# Patient Record
Sex: MALE | Race: WHITE | Employment: FULL TIME | URBAN - METROPOLITAN AREA
[De-identification: names, ages, dates, MRNs, and addresses within clinical notes are randomized per-mention and may not be internally consistent; named-entity substitution may affect disease eponyms.]

---

## 2018-05-18 ENCOUNTER — HOSPITAL ENCOUNTER (OUTPATIENT)
Dept: PHYSICAL THERAPY | Age: 53
Discharge: HOME | End: 2018-05-18
Attending: FAMILY MEDICINE
Payer: COMMERCIAL

## 2018-05-18 DIAGNOSIS — M25.551 RIGHT HIP PAIN: ICD-10-CM

## 2018-05-18 DIAGNOSIS — M54.50 CHRONIC RIGHT-SIDED LOW BACK PAIN WITHOUT SCIATICA: ICD-10-CM

## 2018-05-18 DIAGNOSIS — G89.29 CHRONIC RIGHT-SIDED LOW BACK PAIN WITHOUT SCIATICA: ICD-10-CM

## 2018-05-18 PROCEDURE — 97161 PT EVAL LOW COMPLEX 20 MIN: CPT

## 2018-05-18 PROCEDURE — 97110 THERAPEUTIC EXERCISES: CPT

## 2018-05-18 NOTE — THERAPY EVALUATION
Katemarkosmatilda Yamilet  : 1965  Primary: Rhona Guzman [facility]  Secondary:  [facility] [address] [place] [address], [address], [address]  [phone]   [phone]         OUTPATIENT PHYSICAL THERAPY:Initial Assessment 2018    ICD-10: Treatment Diagnosis: LOW BACK PAIN M54.5; SPINAL INSTABILITIES, LUMBAR REGION M53.2X6    Precautions/Allergies: none reported  Fall Risk Score: 1 (? 5 = High Risk)  MD Orders: evaluate and treat MEDICAL/REFERRING DIAGNOSIS:Right hip pain [M25.551]  Chronic right-sided low back pain without sciatica [M54.5, G89.29]  DATE OF ONSET: about a year ago  REFERRING PHYSICIAN:Jimbo Kaplan DO  RETURN PHYSICIAN APPOINTMENT: did not specify      INITIAL ASSESSMENT:  Mr. Flakito Wise presents to physical therapy with symptoms of right lower back and hip pain. The examination reveals instability in the R L4-S1 facets, core wkns, posterior hip, hip flexor and QL tightness and decreased understanding of proper body mechanics. The examination is of low complexity due to a stable clinical presentation. Skilled physical therapy is recommended to progress the plan of care in order for the patient to return to full function with minimal symptoms. PROBLEM LIST (Impacting functional limitations):  1. Decreased Strength  2. Decreased ADL/Functional Activities  3. Increased Pain  4. Decreased Activity Tolerance  5. Decreased Flexibility/Joint Mobility  6. Decreased [place] with Home Exercise Program INTERVENTIONS PLANNED:  1. Cold  2. Heat  3. Home Exercise Program (HEP)  4. Manual Therapy  5. Range of Motion (ROM)  6. Therapeutic Exercise/Strengthening     TREATMENT PLAN:  Effective Dates: 18 TO 18. Frequency/Duration: 2 times a week for 8 weeks  GOALS: (Goals have been discussed and agreed upon with patient.)  SHORT-TERM FUNCTIONAL GOALS: Time Frame: 2-4 wks  1. Patient will report 25-50% reduction in overall symptoms  2.   Patient will be compliant with HEP and plan of care  3. Patient will report taking 50% less ibuprofen indicating decreased inflammatory symptoms  DISCHARGE GOALS: Time Frame: 6-8 wk  1. Patient will report % reduction in overall symptoms in order to be able to have full function with decreased symptoms  2. Patient will report being able to sleep and wake up with minimal symptoms   3. Patient will report being able to do all household and work related activities with minimal symptoms (0-2/10)  4. Patient will improve on the Oswestry by 5-7 points in order to demonstrate improved function with less pain    Rehabilitation Potential For Stated Goals: Good    Regarding [facility] therapy, I certify that the treatment plan above will be carried out by a therapist or under their direction. Thank you for this referral,  Julianne Severance PT,DPT              HISTORY:   History of Present Injury/Illness (Reason for Referral): Reports that about a yr ago he started having lower back and right hip symptoms. The symptoms came on insidiously. He stopped doing certain exercises at the gym and even took a full break at one point with minimal improvement. 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 Currently he runs about 1.5 miles/day and works out his upper body. Pain is worse in the mornings and causing difficulties with sleep, he is unable to sleep on his back. No numbness,tingiling, bowel or bladder symptoms reported but has noticed cramping in calf and hamstring at times. He keeps his gun holster on the right side. He had x-rays of the pelvis which were negative. Aggravating factors: morning pain, Takes Ibuprofen in the morning and at night.     Relieving factors:  stretching  Past Medical History/Comorbidities:   Past Medical History:   Diagnosis Date    Hypercholesterolemia     Vitamin D deficiency 03/24/2013    [facility] (Level 19)   Mensicus surgery, sinusitis surgery   Social History/Living Environment: lives in a 2 story house  Prior Level of Function/Work/Activity: independent   Current Medications:       Current Outpatient Prescriptions:     naproxen (NAPROSYN) 500 mg tablet, Take 1 Tab by mouth two (2) times daily (with meals). , Disp: 60 Tab, Rfl: 0    cyclobenzaprine (FLEXERIL) 10 mg tablet, Take 1 Tab by mouth nightly., Disp: 30 Tab, Rfl: 0    atorvastatin (LIPITOR) 20 mg tablet, Take 1 Tab by mouth daily. , Disp: 30 Tab, Rfl: 2    DOCOSAHEXANOIC ACID/EPA (FISH OIL PO), Take 1 Cap by mouth daily.  Indications: OTC, Disp: , Rfl:   Date Last Reviewed:  5/18/2018   Number of Personal Factors/Comorbidities that affect the Plan of Care: 0: LOW COMPLEXITY   EXAMINATION:   (Abbreviations: P-pain, NP- no pain, wnl-within normal limits)  Observation/Orthostatic Postural Assessment:    Standing resting posture:  · Bilateral femoral ER  · Increased lordosis at lumbo-sacral junction   · R inonimate appears elevated     Sitting resting posture:  ·   Palpation/tone/tissue texture:    ASIS:symmetrical   PSIS: symmetrical   Leg Length: supine:         Long Sitting:  Sacrum: symmetrical, normal nutation and counternutation     Soft tissue:  · Lumbar extensors/QL:increased QL tightness on R  · Hip flexors: increased tightness in psoas on R side  · Hamstrings: mild tightness noted     PAIVM: (passive accessory intervertebral motion)  · Rotated L at L4-5, tender to palpation at R L4-5 transverse processes     ROM: NP- non painful P-painful  Multisegmental ROM Date:  5-18-18       Flexion 100%, NP   Extension 100%, P   Rotation L n/a   Rotation R n/a   Extension Quadrant + on R   Flexion Quadrant  (-) bilateral      (wnl-within normal limtis)    Hip ROM Date:  5-18-18       Right Left   Flexion Limitedto about 95 deg  100 deg   Extension Limited  wnl   IR limited limited   ER wnl wnl      Thoracic ROM (tested in sitting) Date:  5-18-18       Flexion n/a   Extension limited Rotation L n/a   Rotation R n/a   Side bend L n/a   Side bend R n/a         Strength:  Hip strength Date:  5-18-18       Right Left   Flexion 4-, compensates with paraspinals  5   Abduction n/a n/a   Extension Painful so it was n/a n/a   IR n/a n/a   ER n/a n/a    Core strength Date:  5-18-18       General Inhibited on R side   LPM (L front) 3   LPM ( R front) 2             Special Tests:  FADIR: (-)  Scour: (-)   Neurological Screen:   Myotomes: strong in bilateral LE's         Dermatomes: intact in bilateral LE's         Reflexes: 2+ in achilles and patella         Neural Tension Tests: SLR:(-)   Slump: n/a  Functional Mobility:    · Overhead double leg squat:n/a  · Single leg squat: L:n/a                   R:n/a  · Gait pattern:n/a today  Balance:  Single leg   L:10 sec, min sway  R: 10 sec, min sway     Body Structures Involved:  1. Joints  2. Muscles Body Functions Affected:  1. Sensory/Pain  2. Neuromusculoskeletal  3. Movement Related Activities and Participation Affected:  1. Mobility  2. Self Care  3. Interpersonal Interactions and Relationships  4. Community, Social and [place]   Number of elements that affect the Plan of Care: 4+: HIGH COMPLEXITY   CLINICAL PRESENTATION:   Presentation: Stable and uncomplicated: LOW COMPLEXITY   CLINICAL DECISION MAKING:   Outcome Measure: Tool Used: Modified Oswestry Low Back Pain Questionnaire  Score:  Initial: 7/50  Most Recent: X/50 (Date: -- )   Interpretation of Score: Each section is scored on a 0-5 scale, 5 representing the greatest disability. The scores of each section are added together for a total score of 50. Score 0 1-10 11-20 21-30 31-40 41-49 50   Modifier CH CI CJ CK CL CM CN     Medical Necessity:   · Patient is expected to demonstrate progress in strength, range of motion and symptom levels to return to full function. Reason for Services/Other Comments:  · Patient continues to require skilled intervention due to ongoing symptoms.    Use of outcome tool(s) and clinical judgement create a POC that gives a: Clear prediction of patient's progress: LOW COMPLEXITY   TREATMENT:   (In addition to Assessment/Re-Assessment sessions the following treatments were rendered)    Subjective Pre-Treatment Symptoms:Reports that he has pain for about a yr without any improvements. Wants to find a resolution to it    Therapeutic Exercise: (15 min) Done in order to improve strength, ROM and understanding of current condition. Date:  5-18-18   Activity/Exercise Parameters   Education · Discussed examination findings, HEP, plan of care  · Discussed sleeping with a wedge or pillows under thigh to limit exten  · Discussed his workout routine and things to avoid   Knee to chest and piriformis stretch 30 sec each   Core activation R hip maximally to chest, 20 sec isometric hold, traction resistance applied, instructed to prevent L4-5 from extending    Hip flexor stretch R side, posterior tilt first, 20 sec   Manual Therapy: (-) Done in order to improve joint and soft tissue mobility,reduce muscle guarding, and decrease muscle tone    Modalities: (-) Done in order to reduce swelling and pain    Treatment/Session Assessment:  Patient tolerated the session well. His HEP and plan of care were discussed. · Pain: Initial:    Minimal at rest, 8/10 at worst Post Session:  Minimal symptoms reported at rest ·   Compliance with Program/Exercises: Will assess as treatment progresses. · Recommendations/Intent for next treatment session: \"Next visit will focus on progressing the patients plan of care\".     Future Appointments  Date Time Provider Verena Zhu   5/21/2018 4:30 PM Conrad Pereira, PT [facility]   5/30/2018 8:30 AM Layla Ferrell PT, DPT [facility]   6/6/2018 9:15 AM Long Mota, PT, DPT [facility]     Total Treatment Duration: 15 min, evaluation 45 min  PT Patient Time In/Time Out  Time In: 1000  Time Out: 800 Obie Lee PT, DPT

## 2018-05-21 NOTE — PROGRESS NOTES
Ambulatory/Rehab Services H2 Model Falls Risk Assessment    Risk Factor Pts. ·   Confusion/Disorientation/Impulsivity  []    4 ·   Symptomatic Depression  []   2 ·   Altered Elimination  []   1 ·   Dizziness/Vertigo  []   1 ·   Gender (Male)  [x]   1 ·   Any administered antiepileptics (anticonvulsants):  []   2 ·   Any administered benzodiazepines:  []   1 ·   Visual Impairment (specify):  []   1 ·   Portable Oxygen Use  []   1 ·   Orthostatic ? BP  []   1 ·   History of Recent Falls (within 3 mos.)  []   5     Ability to Rise from Chair (choose one) Pts. ·   Ability to rise in a single movement  [x]   0 ·   Pushes up, successful in one attempt  []   1 ·   Multiple attempts, but successful  []   3 ·   Unable to rise without assistance  []   4   Total: (5 or greater = High Risk) 1     Falls Prevention Plan:   []                Physical Limitations to Exercise (specify):   []                Mobility Assistance Device (type):   []                Exercise/Equipment Adaptation (specify):    ©2010 [facility] [address] Patent #[id number].  Federal Law prohibits the replication, distribution or use without written permission from [facility] [employer]

## 2018-05-30 ENCOUNTER — HOSPITAL ENCOUNTER (OUTPATIENT)
Dept: PHYSICAL THERAPY | Age: 53
Discharge: HOME | End: 2018-05-30
Attending: FAMILY MEDICINE
Payer: COMMERCIAL

## 2018-05-30 PROCEDURE — 97140 MANUAL THERAPY 1/> REGIONS: CPT

## 2018-05-30 PROCEDURE — 97110 THERAPEUTIC EXERCISES: CPT

## 2018-05-30 NOTE — PROGRESS NOTES
Jay Jay Channel  : 1965  Primary: Armin Guerrero   Secondary[facility]  [facility] [address], [place]  [phone]   [phone]         OUTPATIENT PHYSICAL THERAPY:Daily Note 2018    ICD-10: Treatment Diagnosis: LOW BACK PAIN M54.5; SPINAL INSTABILITIES, LUMBAR REGION M53.2X6    Precautions/Allergies: none reported  Fall Risk Score: 1 (? 5 = High Risk)  MD Orders: evaluate and treat MEDICAL/REFERRING DIAGNOSIS:Pain in right hip [M25.551]  DATE OF ONSET: about a year ago  REFERRING PHYSICIAN:Jimbo Kaplan, DO  RETURN PHYSICIAN APPOINTMENT: did not specify      INITIAL ASSESSMENT:  Mr. Yessi Yeung presents to physical therapy with symptoms of right lower back and hip pain. The examination reveals instability in the R L4-S1 facets, core wkns, posterior hip, hip flexor and QL tightness and decreased understanding of proper body mechanics. The examination is of low complexity due to a stable clinical presentation. Skilled physical therapy is recommended to progress the plan of care in order for the patient to return to full function with minimal symptoms. PROBLEM LIST (Impacting functional limitations):  1. Decreased Strength  2. Decreased ADL/Functional Activities  3. Increased Pain  4. Decreased Activity Tolerance  5. Decreased Flexibility/Joint Mobility  6. Decreased [place] with Home Exercise Program INTERVENTIONS PLANNED:  1. Cold  2. Heat  3. Home Exercise Program (HEP)  4. Manual Therapy  5. Range of Motion (ROM)  6. Therapeutic Exercise/Strengthening     TREATMENT PLAN:  Effective Dates: 18 TO 18. Frequency/Duration: 2 times a week for 8 weeks  GOALS: (Goals have been discussed and agreed upon with patient.)  SHORT-TERM FUNCTIONAL GOALS: Time Frame: 2-4 wks  1. Patient will report 25-50% reduction in overall symptoms  2. Patient will be compliant with HEP and plan of care  3.   Patient will report taking 50% less ibuprofen indicating decreased inflammatory symptoms  DISCHARGE GOALS: Time Frame: 6-8 wk  1. Patient will report % reduction in overall symptoms in order to be able to have full function with decreased symptoms  2. Patient will report being able to sleep and wake up with minimal symptoms   3. Patient will report being able to do all household and work related activities with minimal symptoms (0-2/10)  4. Patient will improve on the Oswestry by 5-7 points in order to demonstrate improved function with less pain    Rehabilitation Potential For Stated Goals: Good    Regarding [place] therapy, I certify that the treatment plan above will be carried out by a therapist or under their direction. Thank you for this referral,  Fox Montoya PT,DPT              HISTORY:   History of Present Injury/Illness (Reason for Referral): Reports that about a yr ago he started having lower back and right hip symptoms. The symptoms came on insidiously. He stopped doing certain exercises at the gym and even took a full break at one point with minimal improvement. 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 Currently he runs about 1.5 miles/day and works out his upper body. Pain is worse in the mornings and causing difficulties with sleep, he is unable to sleep on his back. No numbness,tingiling, bowel or bladder symptoms reported but has noticed cramping in calf and hamstring at times. He keeps his gun holster on the right side. He had x-rays of the pelvis which were negative. Aggravating factors: morning pain, Takes Ibuprofen in the morning and at night.     Relieving factors:  stretching  Past Medical History/Comorbidities:   Past Medical History:   Diagnosis Date    Hypercholesterolemia     Vitamin D deficiency 03/24/2013    [facility] (Level 19)   Mensicus surgery, sinusitis surgery   Social History/Living Environment: lives in a 2 story house  Prior Level of Function/Work/Activity: independent   Current Medications:       Current Outpatient Prescriptions:     naproxen (NAPROSYN) 500 mg tablet, Take 1 Tab by mouth two (2) times daily (with meals). , Disp: 60 Tab, Rfl: 0    cyclobenzaprine (FLEXERIL) 10 mg tablet, Take 1 Tab by mouth nightly., Disp: 30 Tab, Rfl: 0    atorvastatin (LIPITOR) 20 mg tablet, Take 1 Tab by mouth daily. , Disp: 30 Tab, Rfl: 2    DOCOSAHEXANOIC ACID/EPA (FISH OIL PO), Take 1 Cap by mouth daily.  Indications: OTC, Disp: , Rfl:   Date Last Reviewed:  5/30/2018   Number of Personal Factors/Comorbidities that affect the Plan of Care: 0: LOW COMPLEXITY   EXAMINATION:   (Abbreviations: P-pain, NP- no pain, wnl-within normal limits)  Observation/Orthostatic Postural Assessment:    Standing resting posture:  · Bilateral femoral ER  · Increased lordosis at lumbo-sacral junction   · R inonimate appears elevated     Sitting resting posture:  ·   Palpation/tone/tissue texture:    ASIS:symmetrical   PSIS: symmetrical   Leg Length: supine:         Long Sitting:  Sacrum: symmetrical, normal nutation and counternutation     Soft tissue:  · Lumbar extensors/QL:increased QL tightness on R  · Hip flexors: increased tightness in psoas on R side  · Hamstrings: mild tightness noted     PAIVM: (passive accessory intervertebral motion)  · Rotated L at L4-5, tender to palpation at R L4-5 transverse processes     ROM: NP- non painful P-painful  Multisegmental ROM Date:  5-18-18       Flexion 100%, NP   Extension 100%, P   Rotation L n/a   Rotation R n/a   Extension Quadrant + on R   Flexion Quadrant  (-) bilateral      (wnl-within normal limtis)    Hip ROM Date:  5-18-18       Right Left   Flexion Limitedto about 95 deg  100 deg   Extension Limited  wnl   IR limited limited   ER wnl wnl      Thoracic ROM (tested in sitting) Date:  5-18-18       Flexion n/a   Extension limited   Rotation L n/a   Rotation R n/a   Side bend L n/a   Side bend R n/a Strength:  Hip strength Date:  5-18-18       Right Left   Flexion 4-, compensates with paraspinals  5   Abduction n/a n/a   Extension Painful so it was n/a n/a   IR n/a n/a   ER n/a n/a    Core strength Date:  5-18-18       General Inhibited on R side   LPM (L front) 3   LPM ( R front) 2             Special Tests:  FADIR: (-)  Scour: (-)   Neurological Screen:   Myotomes: strong in bilateral LE's         Dermatomes: intact in bilateral LE's         Reflexes: 2+ in achilles and patella         Neural Tension Tests: SLR:(-)   Slump: n/a  Functional Mobility:    · Overhead double leg squat:n/a  · Single leg squat: L:n/a                   R:n/a  · Gait pattern:n/a today  Balance:  Single leg   L:10 sec, min sway  R: 10 sec, min sway     Body Structures Involved:  1. Joints  2. Muscles Body Functions Affected:  1. Sensory/Pain  2. Neuromusculoskeletal  3. Movement Related Activities and Participation Affected:  1. Mobility  2. Self Care  3. Interpersonal Interactions and Relationships  4. Community, Social and [place]   Number of elements that affect the Plan of Care: 4+: HIGH COMPLEXITY   CLINICAL PRESENTATION:   Presentation: Stable and uncomplicated: LOW COMPLEXITY   CLINICAL DECISION MAKING:   Outcome Measure: Tool Used: Modified Oswestry Low Back Pain Questionnaire  Score:  Initial: 7/50  Most Recent: X/50 (Date: -- )   Interpretation of Score: Each section is scored on a 0-5 scale, 5 representing the greatest disability. The scores of each section are added together for a total score of 50. Score 0 1-10 11-20 21-30 31-40 41-49 50   Modifier CH CI CJ CK CL CM CN     Medical Necessity:   · Patient is expected to demonstrate progress in strength, range of motion and symptom levels to return to full function. Reason for Services/Other Comments:  · Patient continues to require skilled intervention due to ongoing symptoms.    Use of outcome tool(s) and clinical judgement create a POC that gives a: Clear prediction of patient's progress: LOW COMPLEXITY   TREATMENT:   (In addition to Assessment/Re-Assessment sessions the following treatments were rendered)    Subjective Pre-Treatment Symptoms: reports 2 days after last treatment his back felt the best it's ever felt, but since then its been going 'downhill'. 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 Therapeutic Exercise: (30 min) Done in order to improve strength, ROM and understanding of current condition.     Date:  5-18-18 5/30   Activity/Exercise Parameters    Education · Discussed examination findings, HEP, plan of care  · Discussed sleeping with a wedge or pillows under thigh to limit exten  · Discussed his workout routine and things to avoid · Use tennis ball for self-mfr at home   Recumbent bike  5 min level 3   Knee to chest and piriformis stretch 30 sec each    Core activation R hip maximally to chest, 20 sec isometric hold, traction resistance applied, instructed to prevent L4-5 from extending  review   Hip flexor stretch R side, posterior tilt first, 20 sec review   Seated pelvic tilts  Focus on lumbar flexion for stretch and gapping   2 x 20   Seated lumbar flexion 'slouch' with L rotation  In pelvic tilt position with rotation for R side stretch  2 x 20   Seated lumbar flexion touch hands to floor in between knees  2 x 10   Seated lumbar flexion hands to floor, walk hands to L leg for R side stretch  2 x 10   Seated piriformis stretch  10 sec hold x 3, R   SL QL stretch  30 sec hold x 2, R        Manual Therapy: (25 minutes) Done in order to improve joint and soft tissue mobility,reduce muscle guarding, and decrease muscle tone  - gentle massage and manual ischemic pressure to R lumbar paraspinals and QL  - L sidelying lumbar rotational manipulation    Modalities: (-) Done in order to reduce swelling and pain    Treatment/Session Assessment:  Patient demonstrated significant improvement in motion tolerated after manual therapy, reported no pain doing stretches after manual therapy as opposed to before manual was reporting pain with the movements. Pt may benefit from discussing dry needles for future appointments. Added seated stretches that he can do in car or in courtroom to alleviate pain. · Pain: Initial:    Moderate levels of pain with sitting Post Session:  No pain ·   Compliance with Program/Exercises: Will assess as treatment progresses. · Recommendations/Intent for next treatment session: \"Next visit will focus on progressing the patients plan of care\".     Future Appointments  Date Time Provider Verena Zhu   6/6/2018 9:15 AM Michelle Lorenzana, PT, DPT [facility]     Total Treatment Duration: 55 minutes  PT Patient Time In/Time Out  Time In: 0830  Time Out: [id number]

## 2018-06-06 ENCOUNTER — HOSPITAL ENCOUNTER (OUTPATIENT)
Dept: PHYSICAL THERAPY | Age: 53
Discharge: HOME | End: 2018-06-06
Attending: FAMILY MEDICINE
Payer: COMMERCIAL

## 2018-06-06 PROCEDURE — 97110 THERAPEUTIC EXERCISES: CPT

## 2018-06-06 PROCEDURE — 97140 MANUAL THERAPY 1/> REGIONS: CPT

## 2018-06-06 NOTE — PROGRESS NOTES
George Chandra  : 1965  Primary: [facility]  Secondary:  [facility] [place] [address], [address], [place]  [phone]   [phone]         OUTPATIENT PHYSICAL THERAPY:Daily Note 2018    ICD-10: Treatment Diagnosis: LOW BACK PAIN M54.5; SPINAL INSTABILITIES, LUMBAR REGION M53.2X6    Precautions/Allergies: none reported  Fall Risk Score: 1 (? 5 = High Risk)  MD Orders: evaluate and treat MEDICAL/REFERRING DIAGNOSIS:Pain in right hip [M25.551]  DATE OF ONSET: about a year ago  REFERRING PHYSICIAN:Jimbo Kaplan DO  RETURN PHYSICIAN APPOINTMENT: did not specify      INITIAL ASSESSMENT:  Mr. Ligia Glasgow presents to physical therapy with symptoms of right lower back and hip pain. The examination reveals instability in the R L4-S1 facets, core wkns, posterior hip, hip flexor and QL tightness and decreased understanding of proper body mechanics. The examination is of low complexity due to a stable clinical presentation. Skilled physical therapy is recommended to progress the plan of care in order for the patient to return to full function with minimal symptoms. PROBLEM LIST (Impacting functional limitations):  1. Decreased Strength  2. Decreased ADL/Functional Activities  3. Increased Pain  4. Decreased Activity Tolerance  5. Decreased Flexibility/Joint Mobility  6. Decreased [place] with Home Exercise Program INTERVENTIONS PLANNED:  1. Cold  2. Heat  3. Home Exercise Program (HEP)  4. Manual Therapy  5. Range of Motion (ROM)  6. Therapeutic Exercise/Strengthening     TREATMENT PLAN:  Effective Dates: 18 TO 18. Frequency/Duration: 2 times a week for 8 weeks  GOALS: (Goals have been discussed and agreed upon with patient.)  SHORT-TERM FUNCTIONAL GOALS: Time Frame: 2-4 wks  1. Patient will report 25-50% reduction in overall symptoms  2. Patient will be compliant with HEP and plan of care  3.   Patient will report taking 50% less ibuprofen indicating decreased inflammatory symptoms  DISCHARGE GOALS: Time Frame: 6-8 wk  1. Patient will report % reduction in overall symptoms in order to be able to have full function with decreased symptoms  2. Patient will report being able to sleep and wake up with minimal symptoms   3. Patient will report being able to do all household and work related activities with minimal symptoms (0-2/10)  4. Patient will improve on the Oswestry by 5-7 points in order to demonstrate improved function with less pain    Rehabilitation Potential For Stated Goals: Good    Regarding Maico Brown therapy, I certify that the treatment plan above will be carried out by a therapist or under their direction. Thank you for this referral,  Raj Arrington PT,DPT              HISTORY:   History of Present Injury/Illness (Reason for Referral): Reports that about a yr ago he started having lower back and right hip symptoms. The symptoms came on insidiously. He stopped doing certain exercises at the gym and even took a full break at one point with minimal improvement. 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 Currently he runs about 1.5 miles/day and works out his upper body. Pain is worse in the mornings and causing difficulties with sleep, he is unable to sleep on his back. No numbness,tingiling, bowel or bladder symptoms reported but has noticed cramping in calf and hamstring at times. He keeps his gun holster on the right side. He had x-rays of the pelvis which were negative. Aggravating factors: morning pain, Takes Ibuprofen in the morning and at night.     Relieving factors:  stretching  Past Medical History/Comorbidities:   Past Medical History:   Diagnosis Date    Hypercholesterolemia     [facility]   Mensicus surgery, sinusitis surgery   Social History/Living Environment: lives in a 2 story house  Prior Level of Function/Work/Activity: independent   Current Medications:       Current Outpatient Prescriptions:     naproxen (NAPROSYN) 500 mg tablet, Take 1 Tab by mouth two (2) times daily (with meals). , Disp: 60 Tab, Rfl: 0    cyclobenzaprine (FLEXERIL) 10 mg tablet, Take 1 Tab by mouth nightly., Disp: 30 Tab, Rfl: 0    atorvastatin (LIPITOR) 20 mg tablet, Take 1 Tab by mouth daily. , Disp: 30 Tab, Rfl: 2    DOCOSAHEXANOIC ACID/EPA (FISH OIL PO), Take 1 Cap by mouth daily.  Indications: OTC, Disp: , Rfl:   Date Last Reviewed:  6/6/2018   Number of Personal Factors/Comorbidities that affect the Plan of Care: 0: LOW COMPLEXITY   EXAMINATION:   (Abbreviations: P-pain, NP- no pain, wnl-within normal limits)  Observation/Orthostatic Postural Assessment:    Standing resting posture:  · Bilateral femoral ER  · Increased lordosis at lumbo-sacral junction   · R inonimate appears elevated     Sitting resting posture:  ·   Palpation/tone/tissue texture:    ASIS:symmetrical   PSIS: symmetrical   Leg Length: supine:         Long Sitting:  Sacrum: symmetrical, normal nutation and counternutation     Soft tissue:  · Lumbar extensors/QL:increased QL tightness on R  · Hip flexors: increased tightness in psoas on R side  · Hamstrings: mild tightness noted     PAIVM: (passive accessory intervertebral motion)  · Rotated L at L4-5, tender to palpation at R L4-5 transverse processes     ROM: NP- non painful P-painful  Multisegmental ROM Date:  5-18-18       Flexion 100%, NP   Extension 100%, P   Rotation L n/a   Rotation R n/a   Extension Quadrant + on R   Flexion Quadrant  (-) bilateral      (wnl-within normal limtis)    Hip ROM Date:  5-18-18       Right Left   Flexion Limitedto about 95 deg  100 deg   Extension Limited  wnl   IR limited limited   ER wnl wnl      Thoracic ROM (tested in sitting) Date:  5-18-18       Flexion n/a   Extension limited   Rotation L n/a   Rotation R n/a   Side bend L n/a   Side bend R n/a Strength:  Hip strength Date:  5-18-18       Right Left   Flexion 4-, compensates with paraspinals  5   Abduction n/a n/a   Extension Painful so it was n/a n/a   IR n/a n/a   ER n/a n/a    Core strength Date:  5-18-18       General Inhibited on R side   LPM (L front) 3   LPM ( R front) 2             Special Tests:  FADIR: (-)  Scour: (-)   Neurological Screen:   Myotomes: strong in bilateral LE's         Dermatomes: intact in bilateral LE's         Reflexes: 2+ in achilles and patella         Neural Tension Tests: SLR:(-)   Slump: n/a  Functional Mobility:    · Overhead double leg squat:n/a  · Single leg squat: L:n/a                   R:n/a  · Gait pattern:n/a today  Balance:  Single leg   L:10 sec, min sway  R: 10 sec, min sway     Body Structures Involved:  1. Joints  2. Muscles Body Functions Affected:  1. Sensory/Pain  2. Neuromusculoskeletal  3. Movement Related Activities and Participation Affected:  1. Mobility  2. Self Care  3. Interpersonal Interactions and Relationships  4. Community, Social and [place]   Number of elements that affect the Plan of Care: 4+: HIGH COMPLEXITY   CLINICAL PRESENTATION:   Presentation: Stable and uncomplicated: LOW COMPLEXITY   CLINICAL DECISION MAKING:   Outcome Measure: Tool Used: Modified Oswestry Low Back Pain Questionnaire  Score:  Initial: 7/50  Most Recent: X/50 (Date: -- )   Interpretation of Score: Each section is scored on a 0-5 scale, 5 representing the greatest disability. The scores of each section are added together for a total score of 50. Score 0 1-10 11-20 21-30 31-40 41-49 50   Modifier CH CI CJ CK CL CM CN     Medical Necessity:   · Patient is expected to demonstrate progress in strength, range of motion and symptom levels to return to full function. Reason for Services/Other Comments:  · Patient continues to require skilled intervention due to ongoing symptoms.    Use of outcome tool(s) and clinical judgement create a POC that gives a: Clear prediction of patient's progress: LOW COMPLEXITY   TREATMENT:   (In addition to Assessment/Re-Assessment sessions the following treatments were rendered)    Subjective Pre-Treatment Symptoms: reports that he is having good and bad days. Mondays seem to always be good days but he feels increased pain by the end of the week. Therapeutic Exercise: (15 min) Done in order to improve strength, ROM and understanding of current condition. Date:  5-18-18 Date  5-30-18 Date  6-6-18   Activity/Exercise Parameters     Education · Discussed examination findings, HEP, plan of care  · Discussed sleeping with a wedge or pillows under thigh to limit exten  · Discussed his workout routine and things to avoid · Use tennis ball for self-mfr at home · Discussed HEP  · Discussed sitting mechanics   · Discussed walking every night for mobility    Recumbent bike  5 min level 3 10 min   Knee to chest and piriformis stretch 30 sec each  -   Core activation R hip maximally to chest, 20 sec isometric hold, traction resistance applied, instructed to prevent L4-5 from extending  review -   Hip flexor stretch R side, posterior tilt first, 20 sec review HEP   Seated pelvic tilts  Focus on lumbar flexion for stretch and gapping   2 x 20 discussed   Seated lumbar flexion hands to floor, walk hands to L leg for R side stretch  2 x 10 20 sec    Seated piriformis stretch  10 sec hold x 3, R discussed   SL QL stretch  30 sec hold x 2, R Prayer stretch, 30 sec,2 sets, towards L         Manual Therapy: (30 minutes) Done in order to improve joint and soft tissue mobility,reduce muscle guarding, and decrease muscle tone  - soft tissue mobilization to R QL and paraspinals with trigger point release, done with leg elongation and arm reach  -PA mobilization to L4-L5,central and TP bilateral, grade III-IV    Modalities: (-) Done in order to reduce swelling and pain    Treatment/Session Assessment:  Patient tolerated the session well.  Symptoms were improved and he was able to go through increased R quadrant ROM before onset of pain. Discussed his sitting mechanics in detail and updated HEP. · Pain: Initial:    Mild to moderate soreness reported Post Session:  No pain ·   Compliance with Program/Exercises: Will assess as treatment progresses. · Recommendations/Intent for next treatment session: \"Next visit will focus on progressing the patients plan of care\".     Future Appointments  Date Time Provider Verena Ewingi   6/22/2018 8:30 AM Aman Melton, PT, DPT [facility]   6/29/2018 8:30 AM Justine Joseph, PT, DPT [facility]     Total Treatment Duration: 45 minutes  PT Patient Time In/Time Out  Time In: 0915  Time Out: 1000

## 2018-06-22 ENCOUNTER — HOSPITAL ENCOUNTER (OUTPATIENT)
Dept: PHYSICAL THERAPY | Age: 53
Discharge: HOME | End: 2018-06-22
Attending: FAMILY MEDICINE
Payer: COMMERCIAL

## 2018-06-22 PROCEDURE — 97110 THERAPEUTIC EXERCISES: CPT

## 2018-06-22 PROCEDURE — 97140 MANUAL THERAPY 1/> REGIONS: CPT

## 2018-06-22 NOTE — PROGRESS NOTES
Wagner Landry  : 1965  Primary: [place] [facility]  Secondary:  [facility] [place] [address] [place] [place] [address] [address], [place] [address]  [phone]   [phone]         OUTPATIENT PHYSICAL THERAPY:Daily Note 2018    ICD-10: Treatment Diagnosis: LOW BACK PAIN M54.5; SPINAL INSTABILITIES, LUMBAR REGION M53.2X6    Precautions/Allergies: none reported  Fall Risk Score: 1 (? 5 = High Risk)  MD Orders: evaluate and treat MEDICAL/REFERRING DIAGNOSIS:Pain in right hip [M25.551]  DATE OF ONSET: about a year ago  REFERRING PHYSICIAN:Jimbo Kaplan DO  RETURN PHYSICIAN APPOINTMENT: did not specify      INITIAL ASSESSMENT:  Mr. Mile Rose presents to physical therapy with symptoms of right lower back and hip pain. The examination reveals instability in the R L4-S1 facets, core wkns, posterior hip, hip flexor and QL tightness and decreased understanding of proper body mechanics. The examination is of low complexity due to a stable clinical presentation. Skilled physical therapy is recommended to progress the plan of care in order for the patient to return to full function with minimal symptoms. PROBLEM LIST (Impacting functional limitations):  1. Decreased Strength  2. Decreased ADL/Functional Activities  3. Increased Pain  4. Decreased Activity Tolerance  5. Decreased Flexibility/Joint Mobility  6. Decreased [place] with Home Exercise Program INTERVENTIONS PLANNED:  1. Cold  2. Heat  3. Home Exercise Program (HEP)  4. Manual Therapy  5. Range of Motion (ROM)  6. Therapeutic Exercise/Strengthening     TREATMENT PLAN:  Effective Dates: 18 TO 18. Frequency/Duration: 2 times a week for 8 weeks  GOALS: (Goals have been discussed and agreed upon with patient.)  SHORT-TERM FUNCTIONAL GOALS: Time Frame: 2-4 wks  1. Patient will report 25-50% reduction in overall symptoms  2. Patient will be compliant with HEP and plan of care  3.   Patient will report taking 50% less ibuprofen indicating decreased inflammatory symptoms  DISCHARGE GOALS: Time Frame: 6-8 wk  1. Patient will report % reduction in overall symptoms in order to be able to have full function with decreased symptoms  2. Patient will report being able to sleep and wake up with minimal symptoms   3. Patient will report being able to do all household and work related activities with minimal symptoms (0-2/10)  4. Patient will improve on the Oswestry by 5-7 points in order to demonstrate improved function with less pain    Rehabilitation Potential For Stated Goals: Good    Regarding Cloria [facility]ette therapy, I certify that the treatment plan above will be carried out by a therapist or under their direction. Thank you for this referral,  Tam Baca PT,DPT              HISTORY:   History of Present Injury/Illness (Reason for Referral): Reports that about a yr ago he started having lower back and right hip symptoms. The symptoms came on insidiously. He stopped doing certain exercises at the gym and even took a full break at one point with minimal improvement. 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 Currently he runs about 1.5 miles/day and works out his upper body. Pain is worse in the mornings and causing difficulties with sleep, he is unable to sleep on his back. No numbness,tingiling, bowel or bladder symptoms reported but has noticed cramping in calf and hamstring at times. He keeps his gun holster on the right side. He had x-rays of the pelvis which were negative. Aggravating factors: morning pain, Takes Ibuprofen in the morning and at night.     Relieving factors:  stretching  Past Medical History/Comorbidities:   Past Medical History:   Diagnosis Date    Hypercholesterolemia     Vitamin D deficiency 03/24/2013    [facility] (Level 19)   Mensicus surgery, sinusitis surgery   Social History/Living Environment: lives in a 2 story house  Prior Level of Function/Work/Activity: independent   Current Medications:       Current Outpatient Prescriptions:     naproxen (NAPROSYN) 500 mg tablet, Take 1 Tab by mouth two (2) times daily (with meals). , Disp: 60 Tab, Rfl: 0    cyclobenzaprine (FLEXERIL) 10 mg tablet, Take 1 Tab by mouth nightly., Disp: 30 Tab, Rfl: 0    atorvastatin (LIPITOR) 20 mg tablet, Take 1 Tab by mouth daily. , Disp: 30 Tab, Rfl: 2    DOCOSAHEXANOIC ACID/EPA (FISH OIL PO), Take 1 Cap by mouth daily.  Indications: OTC, Disp: , Rfl:   Date Last Reviewed:  6/22/2018   Number of Personal Factors/Comorbidities that affect the Plan of Care: 0: LOW COMPLEXITY   EXAMINATION:   (Abbreviations: P-pain, NP- no pain, wnl-within normal limits)  Observation/Orthostatic Postural Assessment:    Standing resting posture:  · Bilateral femoral ER  · Increased lordosis at lumbo-sacral junction   · R inonimate appears elevated     Sitting resting posture:  ·   Palpation/tone/tissue texture:    ASIS:symmetrical   PSIS: symmetrical   Leg Length: supine:         Long Sitting:  Sacrum: symmetrical, normal nutation and counternutation     Soft tissue:  · Lumbar extensors/QL:increased QL tightness on R  · Hip flexors: increased tightness in psoas on R side  · Hamstrings: mild tightness noted     PAIVM: (passive accessory intervertebral motion)  · Rotated L at L4-5, tender to palpation at R L4-5 transverse processes     ROM: NP- non painful P-painful  Multisegmental ROM Date:  5-18-18       Flexion 100%, NP   Extension 100%, P   Rotation L n/a   Rotation R n/a   Extension Quadrant + on R   Flexion Quadrant  (-) bilateral      (wnl-within normal limtis)    Hip ROM Date:  5-18-18       Right Left   Flexion Limitedto about 95 deg  100 deg   Extension Limited  wnl   IR limited limited   ER wnl wnl      Thoracic ROM (tested in sitting) Date:  5-18-18       Flexion n/a   Extension limited   Rotation L n/a   Rotation R n/a   Side bend L n/a   Side bend R n/a Strength:  Hip strength Date:  5-18-18       Right Left   Flexion 4-, compensates with paraspinals  5   Abduction n/a n/a   Extension Painful so it was n/a n/a   IR n/a n/a   ER n/a n/a    Core strength Date:  5-18-18       General Inhibited on R side   LPM (L front) 3   LPM ( R front) 2             Special Tests:  FADIR: (-)  Scour: (-)   Neurological Screen:   Myotomes: strong in bilateral LE's         Dermatomes: intact in bilateral LE's         Reflexes: 2+ in achilles and patella         Neural Tension Tests: SLR:(-)   Slump: n/a  Functional Mobility:    · Overhead double leg squat:n/a  · Single leg squat: L:n/a                   R:n/a  · Gait pattern:n/a today  Balance:  Single leg   L:10 sec, min sway  R: 10 sec, min sway     Body Structures Involved:  1. Joints  2. Muscles Body Functions Affected:  1. Sensory/Pain  2. Neuromusculoskeletal  3. Movement Related Activities and Participation Affected:  1. Mobility  2. Self Care  3. Interpersonal Interactions and Relationships  4. Community, Social and [place]   Number of elements that affect the Plan of Care: 4+: HIGH COMPLEXITY   CLINICAL PRESENTATION:   Presentation: Stable and uncomplicated: LOW COMPLEXITY   CLINICAL DECISION MAKING:   Outcome Measure: Tool Used: Modified Oswestry Low Back Pain Questionnaire  Score:  Initial: 7/50  Most Recent: X/50 (Date: -- )   Interpretation of Score: Each section is scored on a 0-5 scale, 5 representing the greatest disability. The scores of each section are added together for a total score of 50. Score 0 1-10 11-20 21-30 31-40 41-49 50   Modifier CH CI CJ CK CL CM CN     Medical Necessity:   · Patient is expected to demonstrate progress in strength, range of motion and symptom levels to return to full function. Reason for Services/Other Comments:  · Patient continues to require skilled intervention due to ongoing symptoms.    Use of outcome tool(s) and clinical judgement create a POC that gives a: Clear prediction of patient's progress: LOW COMPLEXITY   TREATMENT:   (In addition to Assessment/Re-Assessment sessions the following treatments were rendered)    Subjective Pre-Treatment Symptoms: was on a road trip to [facility] for past couple weeks. Used towel roll behind low back in sitting and back generally feels pretty good. Still having a lot of pain in R hip especially with sprinting and sitting. He has also been setting a timer to get up and walk around office every hour, this helps too. Therapeutic Exercise: (15 min) Done in order to improve strength, ROM and understanding of current condition.     Date:  5-18-18 Date  5-30-18 Date  6-6-18 6/22   Activity/Exercise Parameters      Education · Discussed examination findings, HEP, plan of care  · Discussed sleeping with a wedge or pillows under thigh to limit exten  · Discussed his workout routine and things to avoid · Use tennis ball for self-mfr at home · Discussed HEP  · Discussed sitting mechanics   · Discussed walking every night for mobility  · Discussed tennis ball for self piriformis release at home  ·    Recumbent bike  5 min level 3 10 min    Knee to chest and piriformis stretch 30 sec each  -    Core activation R hip maximally to chest, 20 sec isometric hold, traction resistance applied, instructed to prevent L4-5 from extending  review -    Hip flexor stretch R side, posterior tilt first, 20 sec review HEP    Seated pelvic tilts  Focus on lumbar flexion for stretch and gapping   2 x 20 discussed    Seated lumbar flexion hands to floor, walk hands to L leg for R side stretch  2 x 10 20 sec     Seated piriformis stretch  10 sec hold x 3, R discussed    SL QL stretch  30 sec hold x 2, R Prayer stretch, 30 sec,2 sets, towards L    Supine piriformis stretch    15 sec hold x 5   Bridge with march    2 x 10    clamshells    With YTB  2 x 10, BLE   Prone hip IR/ER    With YTB  2 x 10   Prone hip extension    X 10 BLE          Manual Therapy: (30 minutes) Done in order to improve joint and soft tissue mobility,reduce muscle guarding, and decrease muscle tone  - piriformis and hip ER release with muscle energy techniques and with muscle on stretch    Modalities: (-) Done in order to reduce swelling and pain    Treatment/Session Assessment:  Patient reported decreased pain after treatment compared to when he walked in to therapy. Increased fatigue with all exercises, but no pain. · Pain: Initial:    Mild to moderate soreness reported Post Session:  No pain ·   Compliance with Program/Exercises: Will assess as treatment progresses. · Recommendations/Intent for next treatment session: \"Next visit will focus on progressing the patients plan of care\". [employer] exercises emailed to him at: [email or web address]  Access Code: [email or web address]   URL: [email or web address]   Date: 06/22/2018   Prepared by: Yuko Rangel     Exercises  Clamshell with Resistance - 15 reps - 2x daily  Marching Bridge - 10-20 reps - 2x daily  Supine Piriformis Stretch with Foot on Ground - 5 reps - 15-30 second hold - 2x daily    Future Appointments  Date Time Provider Verena Zhu   6/29/2018 8:30 AM Nancy Shankar PT, DPT [facility]     Total Treatment Duration: 45 minutes

## 2018-06-29 ENCOUNTER — HOSPITAL ENCOUNTER (OUTPATIENT)
Dept: PHYSICAL THERAPY | Age: 53
Discharge: HOME | End: 2018-06-29
Attending: FAMILY MEDICINE
Payer: COMMERCIAL

## 2018-06-29 PROCEDURE — 97140 MANUAL THERAPY 1/> REGIONS: CPT

## 2018-06-29 PROCEDURE — 97110 THERAPEUTIC EXERCISES: CPT

## 2018-06-29 NOTE — PROGRESS NOTES
Saba Rivero  : 1965  Primary: [facility]  Secondary:  [facility] [address] [place], [place] [address], [address], [facility] [address]  [phone]   [phone]         OUTPATIENT PHYSICAL THERAPY:Daily Note 2018    ICD-10: Treatment Diagnosis: LOW BACK PAIN M54.5; SPINAL INSTABILITIES, LUMBAR REGION M53.2X6    Precautions/Allergies: none reported  Fall Risk Score: 1 (? 5 = High Risk)  MD Orders: evaluate and treat MEDICAL/REFERRING DIAGNOSIS:Pain in right hip [M25.551]  DATE OF ONSET: about a year ago  REFERRING PHYSICIAN:Jimbo Kaplan DO  RETURN PHYSICIAN APPOINTMENT: did not specify      INITIAL ASSESSMENT:  Mr. Tamir Altamirano presents to physical therapy with symptoms of right lower back and hip pain. The examination reveals instability in the R L4-S1 facets, core wkns, posterior hip, hip flexor and QL tightness and decreased understanding of proper body mechanics. The examination is of low complexity due to a stable clinical presentation. Skilled physical therapy is recommended to progress the plan of care in order for the patient to return to full function with minimal symptoms. PROBLEM LIST (Impacting functional limitations):  1. Decreased Strength  2. Decreased ADL/Functional Activities  3. Increased Pain  4. Decreased Activity Tolerance  5. Decreased Flexibility/Joint Mobility  6. Decreased [place] with Home Exercise Program INTERVENTIONS PLANNED:  1. Cold  2. Heat  3. Home Exercise Program (HEP)  4. Manual Therapy  5. Range of Motion (ROM)  6. Therapeutic Exercise/Strengthening     TREATMENT PLAN:  Effective Dates: 18 TO 18. Frequency/Duration: 2 times a week for 8 weeks  GOALS: (Goals have been discussed and agreed upon with patient.)  SHORT-TERM FUNCTIONAL GOALS: Time Frame: 2-4 wks  1. Patient will report 25-50% reduction in overall symptoms  2. Patient will be compliant with HEP and plan of care  3.   Patient will report taking 50% less ibuprofen indicating decreased inflammatory symptoms  DISCHARGE GOALS: Time Frame: 6-8 wk  1. Patient will report % reduction in overall symptoms in order to be able to have full function with decreased symptoms  2. Patient will report being able to sleep and wake up with minimal symptoms   3. Patient will report being able to do all household and work related activities with minimal symptoms (0-2/10)  4. Patient will improve on the Oswestry by 5-7 points in order to demonstrate improved function with less pain    Rehabilitation Potential For Stated Goals: Good    Regarding [facility] therapy, I certify that the treatment plan above will be carried out by a therapist or under their direction. Thank you for this referral,  Yunior Cardenas PT,DPT              HISTORY:   History of Present Injury/Illness (Reason for Referral): Reports that about a yr ago he started having lower back and right hip symptoms. The symptoms came on insidiously. He stopped doing certain exercises at the gym and even took a full break at one point with minimal improvement. 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 Currently he runs about 1.5 miles/day and works out his upper body. Pain is worse in the mornings and causing difficulties with sleep, he is unable to sleep on his back. No numbness,tingiling, bowel or bladder symptoms reported but has noticed cramping in calf and hamstring at times. He keeps his gun holster on the right side. He had x-rays of the pelvis which were negative. Aggravating factors: morning pain, Takes Ibuprofen in the morning and at night.     Relieving factors:  stretching  Past Medical History/Comorbidities:   Past Medical History:   Diagnosis Date    Hypercholesterolemia     Vitamin D deficiency 03/24/2013    [facility] (Level 19)   Mensicus surgery, sinusitis surgery   Social History/Living Environment: lives in a 2 story house  Prior Level of Function/Work/Activity: independent   Current Medications:       Current Outpatient Prescriptions:     naproxen (NAPROSYN) 500 mg tablet, Take 1 Tab by mouth two (2) times daily (with meals). , Disp: 60 Tab, Rfl: 0    cyclobenzaprine (FLEXERIL) 10 mg tablet, Take 1 Tab by mouth nightly., Disp: 30 Tab, Rfl: 0    atorvastatin (LIPITOR) 20 mg tablet, Take 1 Tab by mouth daily. , Disp: 30 Tab, Rfl: 2    DOCOSAHEXANOIC ACID/EPA (FISH OIL PO), Take 1 Cap by mouth daily.  Indications: OTC, Disp: , Rfl:   Date Last Reviewed:  6/29/2018   Number of Personal Factors/Comorbidities that affect the Plan of Care: 0: LOW COMPLEXITY   EXAMINATION:   (Abbreviations: P-pain, NP- no pain, wnl-within normal limits)  Observation/Orthostatic Postural Assessment:    Standing resting posture:  · Bilateral femoral ER  · Increased lordosis at lumbo-sacral junction   · R inonimate appears elevated     Sitting resting posture:  ·   Palpation/tone/tissue texture:    ASIS:symmetrical   PSIS: symmetrical   Leg Length: supine:         Long Sitting:  Sacrum: symmetrical, normal nutation and counternutation     Soft tissue:  · Lumbar extensors/QL:increased QL tightness on R  · Hip flexors: increased tightness in psoas on R side  · Hamstrings: mild tightness noted     PAIVM: (passive accessory intervertebral motion)  · Rotated L at L4-5, tender to palpation at R L4-5 transverse processes     ROM: NP- non painful P-painful  Multisegmental ROM Date:  5-18-18       Flexion 100%, NP   Extension 100%, P   Rotation L n/a   Rotation R n/a   Extension Quadrant + on R   Flexion Quadrant  (-) bilateral      (wnl-within normal limtis)    Hip ROM Date:  5-18-18       Right Left   Flexion Limitedto about 95 deg  100 deg   Extension Limited  wnl   IR limited limited   ER wnl wnl      Thoracic ROM (tested in sitting) Date:  5-18-18       Flexion n/a   Extension limited   Rotation L n/a   Rotation R n/a   Side bend L n/a   Side bend R n/a Strength:  Hip strength Date:  5-18-18       Right Left   Flexion 4-, compensates with paraspinals  5   Abduction n/a n/a   Extension Painful so it was n/a n/a   IR n/a n/a   ER n/a n/a    Core strength Date:  5-18-18       General Inhibited on R side   LPM (L front) 3   LPM ( R front) 2             Special Tests:  FADIR: (-)  Scour: (-)   Neurological Screen:   Myotomes: strong in bilateral LE's         Dermatomes: intact in bilateral LE's         Reflexes: 2+ in achilles and patella         Neural Tension Tests: SLR:(-)   Slump: n/a  Functional Mobility:    · Overhead double leg squat:n/a  · Single leg squat: L:n/a                   R:n/a  · Gait pattern:n/a today  Balance:  Single leg   L:10 sec, min sway  R: 10 sec, min sway     Body Structures Involved:  1. Joints  2. Muscles Body Functions Affected:  1. Sensory/Pain  2. Neuromusculoskeletal  3. Movement Related Activities and Participation Affected:  1. Mobility  2. Self Care  3. Interpersonal Interactions and Relationships  4. Community, Social and [place]   Number of elements that affect the Plan of Care: 4+: HIGH COMPLEXITY   CLINICAL PRESENTATION:   Presentation: Stable and uncomplicated: LOW COMPLEXITY   CLINICAL DECISION MAKING:   Outcome Measure: Tool Used: Modified Oswestry Low Back Pain Questionnaire  Score:  Initial: 7/50  Most Recent: X/50 (Date: -- )   Interpretation of Score: Each section is scored on a 0-5 scale, 5 representing the greatest disability. The scores of each section are added together for a total score of 50. Score 0 1-10 11-20 21-30 31-40 41-49 50   Modifier CH CI CJ CK CL CM CN     Medical Necessity:   · Patient is expected to demonstrate progress in strength, range of motion and symptom levels to return to full function. Reason for Services/Other Comments:  · Patient continues to require skilled intervention due to ongoing symptoms.    Use of outcome tool(s) and clinical judgement create a POC that gives a: Clear prediction of patient's progress: LOW COMPLEXITY   TREATMENT:   (In addition to Assessment/Re-Assessment sessions the following treatments were rendered)    Subjective Pre-Treatment Symptoms: Reports that he is still sore and had increased pain yesterday. He slept on his stomach and felt better. Sitting stills seems to bring the pain on the most.  He reports that he felt better after the last session and with his stretches but they do not last.     Therapeutic Exercise: (15 min) Done in order to improve strength, ROM and understanding of current condition. Date  6-6-18 Date  6-22-18 Date  6-29-18   Activity/Exercise      Education · Discussed HEP  · Discussed sitting mechanics   · Discussed walking every night for mobility  · Discussed tennis ball for self piriformis release at home  ·  · Discussed HEP  · Discussed sitting on a higher and more firm surface  · Discuss STANDING DESK NEXT VISIT   Knee to chest and piriformis stretch -  30 sec, in supine   Seated piriformis stretch discussed  -   SL QL stretch Prayer stretch, 30 sec,2 sets, towards L  -   Supine piriformis stretch  15 sec hold x 5 30 sec   Bridge with march  2 x 10     clamshells  With YTB  2 x 10, BLE -   Prone hip IR/ER  With YTB  2 x 10 Hip IR w/o resistance for ROM, 10x, 2 sets, OP given   Prone hip extension  X 10 BLE -         Manual Therapy: (35 minutes) Done in order to improve joint and soft tissue mobility,reduce muscle guarding, and decrease muscle tone  - instrument assisted soft tissue mobilization to R glut max in superior section, done with point stimulation, informed consent signed on 6-29-18  - mobilized R hip into flexion with IR, used a belt to facilitate     Modalities: (-) Done in order to reduce swelling and pain    Treatment/Session Assessment:  Patient reported feeling better afterwards. Possible concerns with posterior labrum of the hip. Discussed sitting on higher surfaces to reduce hip flexion angle.     · Pain: Initial: Mild to moderate soreness reported Post Session:  No pain ·   Compliance with Program/Exercises: Will assess as treatment progresses. · Recommendations/Intent for next treatment session: \"Next visit will focus on progressing the patients plan of care\". Jeni roth emailed to him at: [email or web address] Done In :60 Seconds  Access Code: [email or web address]   URL: [email or web address]   Date: 06/22/2018   Prepared by: Cassandra Lacey         Future Appointments  Date Time Provider Verena Zhu   7/5/2018 7:30 AM Erik Fitzgerald PT [facility]   7/13/2018 8:30 AM Ben Lewis PT, DPT [facility]     Total Treatment Duration: 45 minutes  PT Patient Time In/Time Out  Time In: 0830  Time Out: 3493

## 2018-07-05 ENCOUNTER — HOSPITAL ENCOUNTER (OUTPATIENT)
Dept: PHYSICAL THERAPY | Age: 53
Discharge: HOME | End: 2018-07-05
Attending: FAMILY MEDICINE
Payer: COMMERCIAL

## 2018-07-05 PROCEDURE — 97110 THERAPEUTIC EXERCISES: CPT

## 2018-07-05 NOTE — PROGRESS NOTES
Elida Gamble  : 1965  Primary: [facility]  Secondary:  [facility], [address], [place] [address]  [phone]   [phone]         OUTPATIENT PHYSICAL THERAPY:Daily Note 2018    ICD-10: Treatment Diagnosis: LOW BACK PAIN M54.5; SPINAL INSTABILITIES, LUMBAR REGION M53.2X6    Precautions/Allergies: none reported  Fall Risk Score: 1 (? 5 = High Risk)  MD Orders: evaluate and treat MEDICAL/REFERRING DIAGNOSIS:Pain in right hip [M25.551]  DATE OF ONSET: about a year ago  REFERRING PHYSICIAN:Jimbo Kaplan DO  RETURN PHYSICIAN APPOINTMENT: did not specify      INITIAL ASSESSMENT:  Mr. Ashley Peters presents to physical therapy with symptoms of right lower back and hip pain. The examination reveals instability in the R L4-S1 facets, core wkns, posterior hip, hip flexor and QL tightness and decreased understanding of proper body mechanics. The examination is of low complexity due to a stable clinical presentation. Skilled physical therapy is recommended to progress the plan of care in order for the patient to return to full function with minimal symptoms. PROBLEM LIST (Impacting functional limitations):  1. Decreased Strength  2. Decreased ADL/Functional Activities  3. Increased Pain  4. Decreased Activity Tolerance  5. Decreased Flexibility/Joint Mobility  6. Decreased [place] with Home Exercise Program INTERVENTIONS PLANNED:  1. Cold  2. Heat  3. Home Exercise Program (HEP)  4. Manual Therapy  5. Range of Motion (ROM)  6. Therapeutic Exercise/Strengthening     TREATMENT PLAN:  Effective Dates: 18 TO 18. Frequency/Duration: 2 times a week for 8 weeks  GOALS: (Goals have been discussed and agreed upon with patient.)  SHORT-TERM FUNCTIONAL GOALS: Time Frame: 2-4 wks  1. Patient will report 25-50% reduction in overall symptoms  2. Patient will be compliant with HEP and plan of care  3.   Patient will report taking 50% less ibuprofen indicating decreased inflammatory symptoms  DISCHARGE GOALS: Time Frame: 6-8 wk  1. Patient will report % reduction in overall symptoms in order to be able to have full function with decreased symptoms  2. Patient will report being able to sleep and wake up with minimal symptoms   3. Patient will report being able to do all household and work related activities with minimal symptoms (0-2/10)  4. Patient will improve on the Oswestry by 5-7 points in order to demonstrate improved function with less pain    Rehabilitation Potential For Stated Goals: Good            HISTORY:   History of Present Injury/Illness (Reason for Referral): Reports that about a yr ago he started having lower back and right hip symptoms. The symptoms came on insidiously. He stopped doing certain exercises at the gym and even took a full break at one point with minimal improvement. 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 Currently he runs about 1.5 miles/day and works out his upper body. Pain is worse in the mornings and causing difficulties with sleep, he is unable to sleep on his back. No numbness,tingiling, bowel or bladder symptoms reported but has noticed cramping in calf and hamstring at times. He keeps his gun holster on the right side. He had x-rays of the pelvis which were negative. Aggravating factors: morning pain, Takes Ibuprofen in the morning and at night.     Relieving factors:  stretching  Past Medical History/Comorbidities:   Past Medical History:   Diagnosis Date    Hypercholesterolemia     Vitamin D deficiency 03/24/2013    [facility] (Level 19)   Mensicus surgery, sinusitis surgery   Social History/Living Environment: lives in a 2 story house  Prior Level of Function/Work/Activity: independent   Current Medications:       Current Outpatient Prescriptions:     naproxen (NAPROSYN) 500 mg tablet, Take 1 Tab by mouth two (2) times daily (with meals). , Disp: 60 Tab, Rfl: 0    cyclobenzaprine (FLEXERIL) 10 mg tablet, Take 1 Tab by mouth nightly., Disp: 30 Tab, Rfl: 0    atorvastatin (LIPITOR) 20 mg tablet, Take 1 Tab by mouth daily. , Disp: 30 Tab, Rfl: 2    DOCOSAHEXANOIC ACID/EPA (FISH OIL PO), Take 1 Cap by mouth daily.  Indications: OTC, Disp: , Rfl:   Date Last Reviewed:  7/5/2018   Number of Personal Factors/Comorbidities that affect the Plan of Care: 0: LOW COMPLEXITY   EXAMINATION:   (Abbreviations: P-pain, NP- no pain, wnl-within normal limits)  Observation/Orthostatic Postural Assessment:    Standing resting posture:  · Bilateral femoral ER  · Increased lordosis at lumbo-sacral junction   · R inonimate appears elevated     Sitting resting posture:  ·   Palpation/tone/tissue texture:    ASIS:symmetrical   PSIS: symmetrical   Leg Length: supine:         Long Sitting:  Sacrum: symmetrical, normal nutation and counternutation     Soft tissue:  · Lumbar extensors/QL:increased QL tightness on R  · Hip flexors: increased tightness in psoas on R side  · Hamstrings: mild tightness noted     PAIVM: (passive accessory intervertebral motion)  · Rotated L at L4-5, tender to palpation at R L4-5 transverse processes     ROM: NP- non painful P-painful  Multisegmental ROM Date:  5-18-18       Flexion 100%, NP   Extension 100%, P   Rotation L n/a   Rotation R n/a   Extension Quadrant + on R   Flexion Quadrant  (-) bilateral      (wnl-within normal limtis)    Hip ROM Date:  5-18-18       Right Left   Flexion Limitedto about 95 deg  100 deg   Extension Limited  wnl   IR limited limited   ER wnl wnl      Thoracic ROM (tested in sitting) Date:  5-18-18       Flexion n/a   Extension limited   Rotation L n/a   Rotation R n/a   Side bend L n/a   Side bend R n/a         Strength:  Hip strength Date:  5-18-18       Right Left   Flexion 4-, compensates with paraspinals  5   Abduction n/a n/a   Extension Painful so it was n/a n/a   IR n/a n/a   ER n/a n/a Core strength Date:  5-18-18       General Inhibited on R side   LPM (L front) 3   LPM ( R front) 2             Special Tests:  FADIR: (-)  Scour: (-)   Neurological Screen:   Myotomes: strong in bilateral LE's         Dermatomes: intact in bilateral LE's         Reflexes: 2+ in achilles and patella         Neural Tension Tests: SLR:(-)   Slump: n/a  Functional Mobility:    · Overhead double leg squat:n/a  · Single leg squat: L:n/a                   R:n/a  · Gait pattern:n/a today  Balance:  Single leg   L:10 sec, min sway  R: 10 sec, min sway     Body Structures Involved:  1. Joints  2. Muscles Body Functions Affected:  1. Sensory/Pain  2. Neuromusculoskeletal  3. Movement Related Activities and Participation Affected:  1. Mobility  2. Self Care  3. Interpersonal Interactions and Relationships  4. Community, Social and [place]   Number of elements that affect the Plan of Care: 4+: HIGH COMPLEXITY   CLINICAL PRESENTATION:   Presentation: Stable and uncomplicated: LOW COMPLEXITY   CLINICAL DECISION MAKING:   Outcome Measure: Tool Used: Modified Oswestry Low Back Pain Questionnaire  Score:  Initial: 7/50  Most Recent: X/50 (Date: -- )   Interpretation of Score: Each section is scored on a 0-5 scale, 5 representing the greatest disability. The scores of each section are added together for a total score of 50. Score 0 1-10 11-20 21-30 31-40 41-49 50   Modifier CH CI CJ CK CL CM CN     Medical Necessity:   · Patient is expected to demonstrate progress in strength, range of motion and symptom levels to return to full function. Reason for Services/Other Comments:  · Patient continues to require skilled intervention due to ongoing symptoms.    Use of outcome tool(s) and clinical judgement create a POC that gives a: Clear prediction of patient's progress: LOW COMPLEXITY   TREATMENT:   (In addition to Assessment/Re-Assessment sessions the following treatments were rendered)    Subjective Pre-Treatment Symptoms: Pt reports today that he doesn't feel like he's made much progress. The stretching has helped the most; dry needling last time hurt and did not provide any relief. He states the pain is more tolerable and manageable, but it is still there. He is still doing self-release at home with a tennis ball to his hip muscles. He is concerned about passing his upcoming fit test for work. He states sitting and lying flat cause pain, running causes pain. He states standing does not cause any pain. Therapeutic Exercise: (0 min) Done in order to improve strength, ROM and understanding of current condition.     Date  6-6-18 Date  6-22-18 Date  6-29-18   Activity/Exercise      Education · Discussed HEP  · Discussed sitting mechanics   · Discussed walking every night for mobility  · Discussed tennis ball for self piriformis release at home  ·  · Discussed HEP  · Discussed sitting on a higher and more firm surface  · Discuss STANDING DESK NEXT VISIT   Knee to chest and piriformis stretch -  30 sec, in supine   Seated piriformis stretch discussed  -   SL QL stretch Prayer stretch, 30 sec,2 sets, towards L  -   Supine piriformis stretch  15 sec hold x 5 30 sec   Bridge with march  2 x 10     clamshells  With YTB  2 x 10, BLE -   Prone hip IR/ER  With YTB  2 x 10 Hip IR w/o resistance for ROM, 10x, 2 sets, OP given   Prone hip extension  X 10 BLE -           Manual Therapy: (40 minutes) Done in order to improve joint and soft tissue mobility,reduce muscle guarding, and decrease muscle tone  - R hip mobilizations in supine with knee flexed: laterally with stabilization of knee from therapist into only very slight ER, oscillations and long holds; lateral/inferior direction for short holds  - Long axis hip distraction    Modalities: (-) Done in order to reduce swelling and pain    Treatment/Session Assessment:  Patient reported good relief and stretch that radiated into his R lower back/SI area with direct lateral glide to the R hip joint. Lateral/inferior direction did not provide the same relief. Instructed to report to therapist next session the results of today's mobilizations. · Pain: Initial:    Mild to moderate soreness reported Post Session:  No pain ·   Compliance with Program/Exercises: Will assess as treatment progresses. · Recommendations/Intent for next treatment session: \"Next visit will focus on progressing the patients plan of care\". [employer] exercises emailed to him at: [email or web address]  Access Code: [email or web address]   URL: [email or web address]   Date: 06/22/2018   Prepared by: Tanya Bravo       Total Treatment Duration: 45 minutes  PT Patient Time In/Time Out  Time In: 9914  Time Out: 6101

## 2018-07-13 ENCOUNTER — APPOINTMENT (OUTPATIENT)
Dept: PHYSICAL THERAPY | Age: 53
End: 2018-07-13
Attending: FAMILY MEDICINE
Payer: COMMERCIAL

## 2018-11-14 NOTE — PROGRESS NOTES
Ania Nguyen  : 1965  [facility]  [facility]:  [facility], [address], [place] [address]  [phone]   [phone]         OUTPATIENT PHYSICAL THERAPY:Discontinuation Summary 2018    ICD-10: Treatment Diagnosis: LOW BACK PAIN M54.5; SPINAL INSTABILITIES, LUMBAR REGION M53.2X6    Precautions/Allergies: none reported  Fall Risk Score: 1 (? 5 = High Risk)  MD Orders: evaluate and treat MEDICAL/REFERRING DIAGNOSIS:Pain in right hip [M25.551]  DATE OF ONSET: about a year ago  REFERRING PHYSICIAN:Jimbo Kaplan DO  RETURN PHYSICIAN APPOINTMENT: did not specify       ASSESSMENT:  Ania Nguyen has been seen in physical therapy from  to  for 6 visits. Treatment has been discontinued at this time due to patient failing to return for additional treatment. At the time he was not making significant progress and we were still trying to figure out the source of the pain. A follow up phone call was made and a message was left at the time.  Thank you for this referral.       Niyah Gaston DPT, OCS

## 2019-01-22 ENCOUNTER — HOSPITAL ENCOUNTER (OUTPATIENT)
Dept: MRI IMAGING | Age: 54
Discharge: HOME | End: 2019-01-22
Attending: FAMILY MEDICINE
Payer: COMMERCIAL

## 2019-01-22 DIAGNOSIS — G89.29 CHRONIC RIGHT-SIDED LOW BACK PAIN WITH RIGHT-SIDED SCIATICA: ICD-10-CM

## 2019-01-22 DIAGNOSIS — M54.41 CHRONIC RIGHT-SIDED LOW BACK PAIN WITH RIGHT-SIDED SCIATICA: ICD-10-CM

## 2019-01-22 PROCEDURE — 72148 MRI LUMBAR SPINE W/O DYE: CPT

## 2020-05-14 ENCOUNTER — HOSPITAL ENCOUNTER (OUTPATIENT)
Dept: SURGERY | Age: 55
Discharge: HOME | End: 2020-05-14

## 2020-05-14 VITALS — HEIGHT: 68 IN | WEIGHT: 170 LBS | BODY MASS INDEX: 25.76 KG/M2

## 2020-05-14 NOTE — PERIOP NOTES
Patient verified name, , and procedure. Type: 1a; abbreviated assessment per anesthesia guidelines    Labs per anesthesia: None    A negative Covid swab result is required to proceed with surgery; the swab will be collected 4-5 days prior to surgery at the [facility] at [address]. The patient will be contacted by the Covid swab team for an appointment date and time. For questions or concerns the patient should call [phone]. >No visitors at this time; patients will be dropped off at entrance. Instructed pt that they will be notified the day before their procedure by the GI Lab for time of arrival if their procedure is [facility] and Pre-op for [place] cases. Arrival times should be called by 5 pm. If no phone is received the patient should contact their respective hospital. The GI lab telephone number is [phone] and ES Pre-op is [phone]. Follow diet and prep instructions per office including NPO status. If patient has NOT received instructions from office patient is advised to call surgeon office, verbalizes understanding. Bath or shower the night before and the am of surgery with non-mositurizing soap. No lotions, oils, powders, cologne on skin. No make up, eye make up or jewelry. Wear loose fitting comfortable, clean clothing. Must have adult present in building the entire time . Medications for the day of procedure Atorvastatin, patient to hold all vitamins, supplements, herbals 7 days prior to procedure, hold NSAIDs/ASA 5 days prior to procedure, and Tylenol 24 hours prior to procedure. The following discharge instructions reviewed with patient: medication given during procedure may cause drowsiness for several hours, therefore, do not drive or operate machinery for remainder of the day. You may not drink alcohol on the day of your procedure, please resume regular diet and activity unless otherwise directed.  You may experience abdominal distention for several hours that is relieved by the passage of gas. Contact your physician if you have any of the following: fever or chills, severe abdominal pain or excessive amount of bleeding or a large amount when having a bowel movement.  Occasional specks of blood with bowel movement would not be unusual.     P

## 2020-05-18 NOTE — PROGRESS NOTES
Results for Kathy Antony (MRN [id number]) as of 5/18/2020 17:44   Ref.  Range 5/14/2020 11:33   SARS-CoV-2, CRISTHIAN Latest Ref Range: Not Detected  Not Detected

## 2020-05-20 ENCOUNTER — ANESTHESIA (OUTPATIENT)
Dept: ENDOSCOPY | Age: 55
End: 2020-05-20
Payer: COMMERCIAL

## 2020-05-20 ENCOUNTER — HOSPITAL ENCOUNTER (OUTPATIENT)
Age: 55
Setting detail: OUTPATIENT SURGERY
Discharge: HOME | End: 2020-05-20
Attending: SURGERY | Admitting: SURGERY
Payer: COMMERCIAL

## 2020-05-20 ENCOUNTER — ANESTHESIA EVENT (OUTPATIENT)
Dept: ENDOSCOPY | Age: 55
End: 2020-05-20
Payer: COMMERCIAL

## 2020-05-20 VITALS
TEMPERATURE: 98 F | SYSTOLIC BLOOD PRESSURE: 111 MMHG | DIASTOLIC BLOOD PRESSURE: 74 MMHG | WEIGHT: 170 LBS | HEART RATE: 66 BPM | RESPIRATION RATE: 16 BRPM | BODY MASS INDEX: 25.85 KG/M2 | OXYGEN SATURATION: 94 %

## 2020-05-20 PROCEDURE — 74011250636 HC RX REV CODE- 250/636: Performed by: ANESTHESIOLOGY

## 2020-05-20 PROCEDURE — 76040000025: Performed by: SURGERY

## 2020-05-20 PROCEDURE — 76060000032 HC ANESTHESIA 0.5 TO 1 HR: Performed by: SURGERY

## 2020-05-20 PROCEDURE — 74011250636 HC RX REV CODE- 250/636: Performed by: NURSE ANESTHETIST, CERTIFIED REGISTERED

## 2020-05-20 RX ORDER — PROPOFOL 10 MG/ML
INJECTION, EMULSION INTRAVENOUS
Status: DISCONTINUED | OUTPATIENT
Start: 2020-05-20 | End: 2020-05-20 | Stop reason: HOSPADM

## 2020-05-20 RX ORDER — SODIUM CHLORIDE, SODIUM LACTATE, POTASSIUM CHLORIDE, CALCIUM CHLORIDE 600; 310; 30; 20 MG/100ML; MG/100ML; MG/100ML; MG/100ML
100 INJECTION, SOLUTION INTRAVENOUS CONTINUOUS
Status: DISCONTINUED | OUTPATIENT
Start: 2020-05-20 | End: 2020-05-20 | Stop reason: HOSPADM

## 2020-05-20 RX ORDER — PROPOFOL 10 MG/ML
INJECTION, EMULSION INTRAVENOUS AS NEEDED
Status: DISCONTINUED | OUTPATIENT
Start: 2020-05-20 | End: 2020-05-20 | Stop reason: HOSPADM

## 2020-05-20 RX ORDER — SODIUM CHLORIDE, SODIUM LACTATE, POTASSIUM CHLORIDE, CALCIUM CHLORIDE 600; 310; 30; 20 MG/100ML; MG/100ML; MG/100ML; MG/100ML
100 INJECTION, SOLUTION INTRAVENOUS CONTINUOUS
Status: CANCELLED | OUTPATIENT
Start: 2020-05-20

## 2020-05-20 RX ORDER — SODIUM CHLORIDE 0.9 % (FLUSH) 0.9 %
5-40 SYRINGE (ML) INJECTION EVERY 8 HOURS
Status: CANCELLED | OUTPATIENT
Start: 2020-05-20

## 2020-05-20 RX ORDER — SODIUM CHLORIDE 0.9 % (FLUSH) 0.9 %
5-40 SYRINGE (ML) INJECTION AS NEEDED
Status: CANCELLED | OUTPATIENT
Start: 2020-05-20

## 2020-05-20 RX ADMIN — SODIUM CHLORIDE, SODIUM LACTATE, POTASSIUM CHLORIDE, AND CALCIUM CHLORIDE 100 ML/HR: 600; 310; 30; 20 INJECTION, SOLUTION INTRAVENOUS at 10:17

## 2020-05-20 RX ADMIN — PROPOFOL 180 MCG/KG/MIN: 10 INJECTION, EMULSION INTRAVENOUS at 11:15

## 2020-05-20 RX ADMIN — PROPOFOL 100 MG: 10 INJECTION, EMULSION INTRAVENOUS at 11:15

## 2020-05-20 NOTE — ANESTHESIA PREPROCEDURE EVALUATION
Relevant Problems   No relevant active problems       Anesthetic History               Review of Systems / Medical History  Patient summary reviewed, nursing notes reviewed and pertinent labs reviewed    Pulmonary                   Neuro/Psych              Cardiovascular                  Exercise tolerance: >4 METS     GI/Hepatic/Renal                Endo/Other             Other Findings              Physical Exam    Airway  Mallampati: II  TM Distance: 4 - 6 cm  Neck ROM: normal range of motion   Mouth opening: Normal     Cardiovascular  Regular rate and rhythm,  S1 and S2 normal,  no murmur, click, rub, or gallop             Dental  No notable dental hx       Pulmonary  Breath sounds clear to auscultation               Abdominal         Other Findings            Anesthetic Plan    ASA: 2  Anesthesia type: total IV anesthesia          Induction: Intravenous  Anesthetic plan and risks discussed with: Patient

## 2020-05-20 NOTE — DISCHARGE INSTRUCTIONS
Lindsey Jackson M.D.  [phone]    Instructions following colonoscopy:    ACTIVITY:   Resume usual, basic activities around the house today.  You may be light-headed or sleepy from anesthesia, so be careful going up and down stairs.  Avoid driving, operating machinery, or signing documents for 24 hours. DIET:   No restriction. Please note, some people may have nausea or cramps after this procedure which can result in an upset stomach after eating.  Many people have loose stools or diarrhea immediately after colonoscopy. It is also not uncommon to not have a bowel movement for 2-3 days. PAIN:   Some cramping or gas pain is normal after colonoscopy. However, if you experience worsening pain over the course of the day, or pain with associated fever please call the office immediately      [address] IF:   You have a temperature higher than 101.5° Fahrenheit for more than 6 hours.  You have severe nausea or vomiting not relieved by medication; or diarrhea. If you take a blood thinning medicine resume it:    Otherwise, continue home medications as previously prescribed.

## 2020-05-20 NOTE — H&P
History and Physical      Patient: Kerrie Chen    Physician: Vincent Sandy MD    Referring Physician: Vel Vila DO    Chief Complaint: For colonoscopy    History of Present Illness: Pt presents for colonoscopy. Initial screening. History:  Past Medical History:   Diagnosis Date    Hypercholesterolemia     controlled with medication     Vitamin D deficiency 03/24/2013    [facility] (Level 19)     Past Surgical History:   Procedure Laterality Date    HX HEENT  2012    Sinus Surgery    HX MENISCUS REPAIR Left 1994      Social History     Socioeconomic History    Marital status:      Spouse name: Not on file    Number of children: Not on file    Years of education: Not on file    Highest education level: Not on file   Tobacco Use    Smoking status: Never Smoker    Smokeless tobacco: Former User   Substance and Sexual Activity    Alcohol use: Yes     Comment: Social    Drug use: No    Sexual activity: Yes     Partners: Female      Family History   Problem Relation Age of Onset    No Known Problems Mother     Cancer Father         Skin    Heart Attack Father     Coronary Artery Disease Father        Medications:   Prior to Admission medications    Medication Sig Start Date End Date Taking? Authorizing Provider   atorvastatin (LIPITOR) 20 mg tablet Take 1 Tab by mouth daily. 4/28/20  Yes Natasha Minus P, DO   cholecalciferol, vitamin D3, (VITAMIN D3) 50 mcg (2,000 unit) tab Take  by mouth. Yes Provider, Historical   DOCOSAHEXANOIC ACID/EPA (FISH OIL PO) Take 1 Cap by mouth daily. Indications: OTC   Yes Provider, Historical       Allergies: Allergies   Allergen Reactions    Amoxil [Amoxicillin] Hives     Swelling, redness       Physical Exam:     Vital Signs:   Visit Vitals  /83   Pulse 72   Temp 97.9 °F (36.6 °C)   Resp 17   Wt 170 lb (77.1 kg)   SpO2 97%   BMI 25.85 kg/m²     .     General: no distress      Heart: regular   Lungs: unlabored   Abdominal: soft Neurological: Grossly normal        Findings/Diagnosis: Screening for colorectal cancer     Plan of Care/Planned Procedure: Colonoscopy, possible polypectomy. Pt/designee has reviewed the colonoscopy information sheet. Any questions have been discussed. They agree to proceed.       Signed:  Hafsa Messer MD   5/20/2020

## 2020-05-20 NOTE — ANESTHESIA POSTPROCEDURE EVALUATION
Procedure(s):  COLONOSCOPY/ 27.    total IV anesthesia    Anesthesia Post Evaluation      Multimodal analgesia: multimodal analgesia used between 6 hours prior to anesthesia start to PACU discharge  Patient location during evaluation: PACU  Patient participation: complete - patient participated  Level of consciousness: awake and awake and alert  Pain management: adequate  Airway patency: patent  Anesthetic complications: no  Cardiovascular status: acceptable  Respiratory status: acceptable  Hydration status: acceptable  Post anesthesia nausea and vomiting:  controlled      Vitals Value Taken Time   /74 5/20/2020 11:58 AM   Temp 36.7 °C (98 °F) 5/20/2020 11:43 AM   Pulse 66 5/20/2020 11:58 AM   Resp 16 5/20/2020 11:58 AM   SpO2 94 % 5/20/2020 11:58 AM

## 2020-05-20 NOTE — PROCEDURES
Procedure in Detail:  Informed consent was obtained for the procedure. The patient was placed in the left lateral decubitus position and sedation was induced by anesthesia. The scope was inserted into the rectum and advanced under direct vision to the cecum, which was identified by the ileocecal valve and appendiceal orifice. The quality of the colonic preparation was good. A careful inspection was made as the colonoscope was withdrawn, including a retroflexed view of the rectum; findings and interventions are described below. Appropriate photodocumentation was obtained. Findings:   Rectum:     - Protruding lesions:     -Internal Hemorrhoids  Sigmoid:     - Excavated lesions:     - Diverticulosis  Descending Colon:   Normal  Transverse Colon:   Normal  Ascending Colon:   Normal  Cecum:   Normal          Specimens: No specimens were collected. Complications: None; patient tolerated the procedure well. \    EBL - none    Recommendations:   - For colon cancer screening in this average-risk patient, colonoscopy may be repeated in 10 years.      Signed By: Davin Skinner MD                        May 20, 2020

## 2021-11-30 ENCOUNTER — HOSPITAL ENCOUNTER (OUTPATIENT)
Dept: PHYSICAL THERAPY | Age: 56
Discharge: HOME | End: 2021-11-30
Payer: COMMERCIAL

## 2021-11-30 DIAGNOSIS — S83.411A TEAR OF MEDIAL COLLATERAL LIGAMENT OF RIGHT KNEE, INITIAL ENCOUNTER: ICD-10-CM

## 2021-11-30 PROCEDURE — 97161 PT EVAL LOW COMPLEX 20 MIN: CPT

## 2021-11-30 PROCEDURE — 97110 THERAPEUTIC EXERCISES: CPT

## 2021-11-30 NOTE — PROGRESS NOTES
Christa Gaitan  : 1965  Primary: [facility] [place]  Secondary:  [address] at [facility]  [place] , [address], [place] [address]  [phone]   [phone]                                  OUTPATIENT PHYSICAL THERAPY: Daily Treatment Note 2021  Visit Count:  1  ICD-10: Treatment Diagnosis: Pain in joint, right knee (M25.561)  Sprain of medial collateral Ligament of right knee,sequela (I48.032T)  Precautions/Allergies:   Amoxil [amoxicillin]   TREATMENT PLAN:  Effective Dates: 2021 TO 2022 (60 days). Frequency/Duration: 2 times a week for 60 Day(s)     MEDICAL/REFERRING DIAGNOSIS:  Tear of medial collateral ligament of right knee, initial encounter [T87.686Z]   DATE OF ONSET: 7 weeks ago  REFERRING PHYSICIAN: Lorenzo Heller MD MD Orders: Agustin Kristin and Treat  Return MD Appointment: not scheduled     Pre-treatment Symptoms/Complaints:  Christa Gaitan is a 55 y.o. male who presents with complaints of pain and instability in his right knee. Pain: Initial: Pain Intensity 1: 0  Pain Location 1: Knee  Pain Orientation 1: Right  Post Session:  0/10   Medications Last Reviewed:  2021  Updated Objective Findings:     TREATMENT:   THERAPEUTIC EXERCISE: (15 minutes):  Exercises per grid below to improve mobility, strength and coordination. Required minimal verbal cues to promote proper body mechanics. Progressed resistance, range and repetitions as indicated. Access Code: [email or web address]  URL: [email or web address]  Date: 2021  Prepared by:  Rishabh Salinas    Exercises  Supine Quad Set - 2 x daily - 1 sets - 10 reps  Supine Active Straight Leg Raise - 2 x daily - 1 sets - 10 reps  Supine Knee Extension Strengthening - 2 x daily - 1 sets - 10 reps  Supine Active Knee Extension with Hand Support and Leg Straight - 2 x daily - 1 sets - 10 reps  Supine Hip Adduction Isometric with Ball - 2 x daily - 1 sets - 10 reps     Date:  11-30-21 Date:   Date:     Activity/Exercise Parameters Parameters Parameters   HEP As above                                           Treatment/Session Summary:    · Response to Treatment: Timoteo Martin demonstrates good understanding of initial HEP. · Communication/Consultation:  HEP  · Equipment provided today:  instruction sheet  · Recommendations/Intent for next treatment session: Next visit will focus on progressive right knee stabilization.     Total Treatment Billable Duration:  15 min therex  PT Patient Time In/Time Out  Time In: 1615  Time Out: 1700  Alec Emanuel PT    Future Appointments   Date Time Provider Verena Zhu   12/2/2021 10:00 AM Otis Brawn, PT, DPT [facility]   12/7/2021  2:30 PM Brandi العلي, PT [facility]   12/9/2021  2:30 PM Brandi العلي, PT [facility]   12/14/2021  4:30 PM Otis Brawn, PT, DPT [facility]   12/16/2021  2:30 PM Brandi العلي, PT [facility]   12/20/2021  2:30 PM Brandi العلي, PT [facility]   12/22/2021  3:15 PM Brandi العلي, PT [facility]   12/27/2021  4:45 PM Otis Brawn, PT, DPT [facility]   12/29/2021  2:30 PM Brandi العلي, PT [facility]   1/3/2022  2:15 PM Otis Brawn, PT, DPT [facility]   1/5/2022  2:30 PM Corry Salinas, PT [facility]

## 2021-12-01 NOTE — THERAPY EVALUATION
Jason Castellano  : 1965  Primary: [place] [place]  Secondary:  [address]  at [facility]  [address] , [address], [place] [address]  [phone]   [phone]        OUTPATIENT PHYSICAL THERAPY:Initial Assessment 2021    ICD-10: Treatment Diagnosis: Pain in joint, right knee (M25.561)  Sprain of medial collateral Ligament of right knee,sequela (V51.112D)  Precautions/Allergies:   Amoxil [amoxicillin]   TREATMENT PLAN:  Effective Dates: 2021 TO 2022 (60 days). Frequency/Duration: 2 times a week for 60 Day(s)     MEDICAL/REFERRING DIAGNOSIS:  Tear of medial collateral ligament of right knee, initial encounter [N50.122P]   DATE OF ONSET: 7 weeks ago  REFERRING PHYSICIAN: Kera Hoffmann MD MD Orders: Lurlene Najjar and Treat  Return MD Appointment: not scheduled     INITIAL ASSESSMENT:  Jason Castellano is a 55 y.o. male who presents with complaints of pain and feeling instability in his right knee. Patient states he was walking across grass yard about 7 weeks ago, when he felt \"pop\" in right knee with sudden onset of pain in medial right knee. [facility] at [facility] were negative, but pain continued. He was seen by Dr. Mary Kay Armas at [facility], and MRI indicated MCL tear and \"roughing of edges consistent with possible previous scope. Jason Castellaon does report previous scope of right knee. He was placed in knee immobilizer and referred to PT. Pt may benefit from PT to address the following problem list.   PROBLEM LIST (Impacting functional limitations):  1. Decreased Strength  2. Decreased ADL/Functional Activities  3. Increased Pain INTERVENTIONS PLANNED:  1. Cryotherapy  2. Electrical Stimulation  3. Home Exercise Program (HEP)  4. Manual Therapy  5. Therapeutic Exercise/Strengthening  6. Vasopnuematic compression     GOALS: (Goals have been discussed and agreed upon with patient.)  Short-Term Functional Goals: Time Frame: 3 weeks  1.  Independent in initial HEP  2. minimal TTP medial joint line  3. Non antalgic gait  Discharge Goals: Time Frame: 6 weeks  1. Independent in advanced HEP  2. Return to painfree ADL's  CLINICAL DECISION MAKING:   Outcome Measure: Tool Used: Lower Extremity Functional Scale (LEFS)  Score:  Initial: 49/80   1-30-21 Most Recent: X/80 (Date: -- )   Interpretation of Score: 20 questions each scored on a 5 point scale with 0 representing \"extreme difficulty or unable to perform\" and 4 representing \"no difficulty\". The lower the score, the greater the functional disability. 80/80 represents no disability. Minimal detectable change is 9 points. Medical Necessity:   · Patient demonstrates good rehab potential due to higher previous functional level. · Pain limiting activities  Reason for Services/Other Comments:  · Patient continues to require modification of therapeutic interventions to increase complexity of exercises. PT Patient Time In/Time Out  Time In: 1615  Time Out: 1700  Rehabilitation Potential For Stated Goals: Excellent  Regarding Rahul Nava's therapy, I certify that the treatment plan above will be carried out by a therapist or under their direction. Thank you for this referral,  Evelyn Chu, PT                 The information in this section was collected on 11-30-21 (except where otherwise noted). HISTORY:   History of Present Injury/Illness (Reason for Referral):  Sudden onset of pain in right knee while walking over grass lawn. Past Medical History/Comorbidities:   Mr. Cedric Ledezma  has a past medical history of Hypercholesterolemia and Vitamin D deficiency (03/24/2013). Mr. Cedric Ledezma  has a past surgical history that includes hx heent (2012); hx meniscus repair (Left, 1994); pr colonoscopy flx dx w/collj spec when pfrmd (5/20/2020); and colonoscopy (N/A, 5/20/2020).   Social History/Living Environment:     no barriers reported  Prior Level of Function/Work/Activity:  Retired [employer] agent  Dominant Side:         RIGHT     Ambulatory/Rehab Services H2 Model Falls Risk Assessment    Risk Factors:       (1)  Gender [Male] Ability to Rise from Chair:       (0)  Ability to rise in a single movement    Falls Prevention Plan:       No modifications necessary   Total: (5 or greater = High Risk): 1    ©2010 [facility] of [employer]. All Rights Reserved. [employer]annah [employer] Patent #[id number]. Federal Law prohibits the replication, distribution or use without written permission from [facility] [employer]     Current Medications:       Current Outpatient Medications:     atorvastatin (LIPITOR) 20 mg tablet, TAKE ONE TABLET BY MOUTH ONE TIME DAILY , Disp: 90 Tab, Rfl: 3    cholecalciferol, vitamin D3, (VITAMIN D3) 50 mcg (2,000 unit) tab, Take  by mouth., Disp: , Rfl:     DOCOSAHEXANOIC ACID/EPA (FISH OIL PO), Take 1 Cap by mouth daily. Indications: OTC, Disp: , Rfl:    Date Last Reviewed:  11/30/2021   Number of Personal Factors/Comorbidities that affect the Plan of Care: 0: LOW COMPLEXITY   EXAMINATION:   Observation/Orthostatic Postural Assessment:          Minimal edema noted right knee - mild decrease in muscle mass noted right VMO. Palpation:          Mild TTP medial joint line right knee  ROM:          Demonstrates -2 to 130 degrees of flexion  Strength:          Demonstrates 5/5 strength BLE  Special Tests:          Drawer signs (-) , valgus/varus stress (-) patellar compression (-)   Body Structures Involved:  1. Joints  2. Muscles  3. Ligaments Body Functions Affected:  1. Movement Related Activities and Participation Affected:  1.  General Tasks and Demands   Number of elements (examined above) that affect the Plan of Care: 1-2: LOW COMPLEXITY   CLINICAL PRESENTATION:   Presentation: Stable and uncomplicated: LOW COMPLEXITY      Use of outcome tool(s) and clinical judgement create a POC that gives a: Clear prediction of patient's progress: LOW COMPLEXITY                    Treatment/Session Assessment:    · Response to Treatment:  Pt reports understanding of and agreement with treatment plan. · Compliance with Program/Exercises: Will assess as treatment progresses. · Recommendations/Intent for next treatment session: \"Next visit will focus on advancements to more challenging activities\". Future Appointments   Date Time Provider Verena Zhu   12/2/2021 10:00 AM Carol Ann Parent, PT, DPT [facility]   12/7/2021  2:30 PM Earl Shaker, PT [facility]   12/9/2021  2:30 PM Earl Shaker, PT [facility]   12/14/2021  4:30 PM Carol Ann Orozco, PT, DPT [facility]   12/16/2021  2:30 PM Earl Shaker, PT [facility]   12/20/2021  2:30 PM Earl Shaker, PT [facility]   12/22/2021  3:15 PM Earl Shaker, PT [facility]   12/27/2021  4:45 PM Carol Ann Parent, PT, DPT [facility]   12/29/2021  2:30 PM Earl Shaker, PT [facility]   1/3/2022  2:15 PM Carol Ann Parent, PT, DPT [facility]   1/5/2022  2:30 PM Gelacio Salinas, PT [facility]     Please explain any variance from Plan of Care.   Total Treatment Duration: 25 min tianna Onofre, PT

## 2021-12-02 ENCOUNTER — HOSPITAL ENCOUNTER (OUTPATIENT)
Dept: PHYSICAL THERAPY | Age: 56
Discharge: HOME | End: 2021-12-02
Payer: COMMERCIAL

## 2021-12-02 PROCEDURE — 97110 THERAPEUTIC EXERCISES: CPT

## 2021-12-02 NOTE — PROGRESS NOTES
Norris Akins  : 1965  Primary: [place]  Secondary:  [address]  at [facility]  [address], [address], [place] [address]  [phone]   [phone]                                  OUTPATIENT PHYSICAL THERAPY: Daily Treatment Note 2021  Visit Count:  2  ICD-10: Treatment Diagnosis: Pain in joint, right knee (M25.561)  Sprain of medial collateral Ligament of right knee,sequela (M65.954G)  Precautions/Allergies:   Amoxil [amoxicillin]   TREATMENT PLAN:  Effective Dates: 2021 TO 2022 (60 days). Frequency/Duration: 2 times a week for 60 Day(s)     MEDICAL/REFERRING DIAGNOSIS:  Tear of medial collateral ligament of right knee, initial encounter [I40.665D]   DATE OF ONSET: 7 weeks ago  REFERRING PHYSICIAN: Rikki Watson MD MD Orders: Tanya Malloy and Treat  Return MD Appointment: not scheduled     Pre-treatment Symptoms/Complaints:  Norris Akins is a 55 y.o. male reports that the exercises from the first session helped. Still feels instability at times. Pain: Initial: Pain Intensity 1: 0  Pain Location 1: Knee  Post Session:  0/10   Medications Last Reviewed:  2021  Updated Objective Findings:     TREATMENT:   THERAPEUTIC EXERCISE: (45 minutes):  Exercises per grid below to improve mobility, strength and coordination. Required minimal verbal cues to promote proper body mechanics. Progressed resistance, range and repetitions as indicated. Access Code: [email or web address]  URL: [email or web address]  Date: 2021  Prepared by:  Rishabh Salinas    Exercises  Supine Quad Set - 2 x daily - 1 sets - 10 reps  Supine Active Straight Leg Raise - 2 x daily - 1 sets - 10 reps  Supine Knee Extension Strengthening - 2 x daily - 1 sets - 10 reps  Supine Active Knee Extension with Hand Support and Leg Straight - 2 x daily - 1 sets - 10 reps  Supine Hip Adduction Isometric with Ball - 2 x daily - 1 sets - 10 reps     Date:  11-30-21 Date:  12-2-21 Date:     Activity/Exercise Parameters Parameters Parameters   HEP As above reviewed     Step up  4\", 10x, 3 sets, w/ TKE, purple band    Side-lying hip abd  10x, 3 sets, legs locked at 0     Shuttle  50lbs, SL, 10x, 3 sets    bridge  10x, 3 sets, w/ abd resistance    squats  Double leg, trX, partial, 10x, 3 stes            Treatment/Session Summary:    · Response to Treatment: Severo Gavel tolerated the session well. Discussed precautions including no twisting, lateral bending or deep squatting. Discussed starting stationary bike or elliptical at the gym. · Communication/Consultation:  HEP  · Equipment provided today:  instruction sheet  · Recommendations/Intent for next treatment session: Next visit will focus on progressive right knee stabilization.     Total Treatment Billable Duration:  45 min theresherine Barr PT, DPT    Future Appointments   Date Time Provider Verena Audra   12/7/2021  2:30 PM Earl Casper, PT [facility]   12/9/2021  2:30 PM Earl Casper, PT [facility]   12/14/2021  4:30 PM Carol Ann Orozco, PT, DPT [facility]   12/16/2021  2:30 PM Earl Casper, PT [facility]   12/20/2021  2:30 PM Earl Casper, PT [facility]   12/22/2021  3:15 PM Earl Casper, PT [facility]   12/27/2021  4:45 PM Carol Ann Orozco, PT, DPT [facility]   12/29/2021  2:30 PM Earl Casper, PT [facility]   1/3/2022  2:15 PM Carol Ann Orozco, PT, DPT [facility]   1/5/2022  2:30 PM Gelacio Salinas, PT [facility]

## 2021-12-07 ENCOUNTER — HOSPITAL ENCOUNTER (OUTPATIENT)
Dept: PHYSICAL THERAPY | Age: 56
Discharge: HOME | End: 2021-12-07
Payer: COMMERCIAL

## 2021-12-07 PROCEDURE — 97110 THERAPEUTIC EXERCISES: CPT

## 2021-12-07 NOTE — PROGRESS NOTES
Devan Moraes  : 1965  Primary: Mary Corrigan [facility]  Secondary:  [address] at [facility]  [place] , [address], [place]  [phone]   [phone]                                  OUTPATIENT PHYSICAL THERAPY: Daily Treatment Note 2021  Visit Count:  3  ICD-10: Treatment Diagnosis: Pain in joint, right knee (M25.561)  Sprain of medial collateral Ligament of right knee,sequela (G02.274W)  Precautions/Allergies:   Amoxil [amoxicillin]   TREATMENT PLAN:  Effective Dates: 2021 TO 2022 (60 days). Frequency/Duration: 2 times a week for 60 Day(s)     MEDICAL/REFERRING DIAGNOSIS:  Tear of medial collateral ligament of right knee, initial encounter [U52.515M]   DATE OF ONSET: 7 weeks ago  REFERRING PHYSICIAN: Roro Cain MD MD Orders: Chidi Taran and Treat  Return MD Appointment: not scheduled     Pre-treatment Symptoms/Complaints:  Devan Moraes is a 55 y.o. male reports continued gradual improvement in symptoms. Pain: Initial: Pain Intensity 1: 0  Pain Location 1: Knee  Post Session:  0/10   Medications Last Reviewed:  2021  Updated Objective Findings:     TREATMENT:   THERAPEUTIC EXERCISE: (40 minutes):  Exercises per grid below to improve mobility, strength and coordination. Required minimal verbal cues to promote proper body mechanics. Progressed resistance, range and repetitions as indicated. Access Code: [email or web address]  URL: [email or web address]  Date: 2021  Prepared by:  Rishabh Salinas    Exercises  Supine Quad Set - 2 x daily - 1 sets - 10 reps  Supine Active Straight Leg Raise - 2 x daily - 1 sets - 10 reps  Supine Knee Extension Strengthening - 2 x daily - 1 sets - 10 reps  Supine Active Knee Extension with Hand Support and Leg Straight - 2 x daily - 1 sets - 10 reps  Supine Hip Adduction Isometric with Ball - 2 x daily - 1 sets - 10 reps Date:  11-30-21 Date:  12-2-21 Date:  12-7-21   Activity/Exercise Parameters Parameters Parameters   HEP As above reviewed     Recumbent stepper   Level 2 x 10'   Step up  4\", 10x, 3 sets, w/ TKE, purple band X 10 BLE   Lat step ups   X 10 BLE   Ant heel tap   X 10 BLE   QS   2 x 10   SLR   2# 2 x 10   SAQ   2# 2 x 10   Side-lying hip abd  10x, 3 sets, legs locked at 0  2# 2 x 10   Shuttle  50lbs, SL, 10x, 3 sets 50lbs, SL, 10x, 2 sets   bridge  10x, 3 sets, w/ abd resistance    squats  Double leg, trX, partial, 10x, 3 stes            Treatment/Session Summary:    · Response to Treatment: Erickson Slater tolerated addition of resistance well. Discussed adding step down heel taps to HEP. · Communication/Consultation:  None today  · Equipment provided today:  None today  · Recommendations/Intent for next treatment session: Next visit will focus on progressive right knee stabilization.     Total Treatment Billable Duration:  40 min therex  PT Patient Time In/Time Out  Time In: 1430  Time Out: 1515  Juan Duran PT    Future Appointments   Date Time Provider Verena Zhu   12/9/2021  2:30 PM Toy Gonzales, PT [facility]   12/14/2021  4:30 PM Alexx Nichols, PT, DPT [facility]   12/16/2021  2:30 PM Toy Holcombg, PT [facility]   12/20/2021  2:30 PM Toy Gonzales, PT [facility]   12/22/2021  3:15 PM Toy Holcombg, PT [facility]   12/27/2021  4:45 PM Alexx Nichols, PT, DPT [facility]   12/29/2021  2:30 PM Toy Gonzales, PT [facility]   1/3/2022  2:15 PM Alexx Nichols, PT, DPT [facility]   1/5/2022  2:30 PM Silvia Salinas, PT [facility]

## 2021-12-09 ENCOUNTER — HOSPITAL ENCOUNTER (OUTPATIENT)
Dept: PHYSICAL THERAPY | Age: 56
Discharge: HOME | End: 2021-12-09
Payer: COMMERCIAL

## 2021-12-09 PROCEDURE — 97110 THERAPEUTIC EXERCISES: CPT

## 2021-12-09 NOTE — PROGRESS NOTES
Francisco Raya  : 1965  Primary: [employer]  Secondary:  [address] at [facility]  [place] , [address], [place] [address]  [phone]   [phone]                                  OUTPATIENT PHYSICAL THERAPY: Daily Treatment Note 2021  Visit Count:  4  ICD-10: Treatment Diagnosis: Pain in joint, right knee (M25.561)  Sprain of medial collateral Ligament of right knee,sequela (O66.406U)  Precautions/Allergies:   Amoxil [amoxicillin]   TREATMENT PLAN:  Effective Dates: 2021 TO 2022 (60 days). Frequency/Duration: 2 times a week for 60 Day(s)     MEDICAL/REFERRING DIAGNOSIS:  Tear of medial collateral ligament of right knee, initial encounter [L58.923D]   DATE OF ONSET: 7 weeks ago  REFERRING PHYSICIAN: Brenda Aviles MD MD Orders: Eulene Medal and Treat  Return MD Appointment: not scheduled     Pre-treatment Symptoms/Complaints:  Francisco Raya is a 55 y.o. male reports continued gradual improvement in symptoms. Pain: Initial: Pain Intensity 1: 0  Post Session:  0/10   Medications Last Reviewed:  2021  Updated Objective Findings:     TREATMENT:   THERAPEUTIC EXERCISE: (40 minutes):  Exercises per grid below to improve mobility, strength and coordination. Required minimal verbal cues to promote proper body mechanics. Progressed resistance, range and repetitions as indicated. Access Code: [email or web address]  URL: [email or web address]  Date: 2021  Prepared by:  Rishabh Salinas    Exercises  Supine Quad Set - 2 x daily - 1 sets - 10 reps  Supine Active Straight Leg Raise - 2 x daily - 1 sets - 10 reps  Supine Knee Extension Strengthening - 2 x daily - 1 sets - 10 reps  Supine Active Knee Extension with Hand Support and Leg Straight - 2 x daily - 1 sets - 10 reps  Supine Hip Adduction Isometric with Ball - 2 x daily - 1 sets - 10 reps     Date:  21 Date:  12-2-21 Date:  12-7-21 Date:  12-9-21   Activity/Exercise Parameters Parameters Parameters Parameters   HEP As above reviewed      Recumbent stepper   Level 2 x 10' Level 2 x 10'   Step up  4\", 10x, 3 sets, w/ TKE, purple band X 10 BLE X 10 BLE   Lat step ups   X 10 BLE X 10 BLE   Ant heel tap   X 10 BLE X 10 BLE   QS   2 x 10 2 x 10   SLR   2# 2 x 10 2# 2 x 10   SAQ   2# 2 x 10 2# 2 x 10   Side-lying hip abd  10x, 3 sets, legs locked at 0  2# 2 x 10 2# 2 x 10   Shuttle  50lbs, SL, 10x, 3 sets 50lbs, SL, 10x, 2 sets 50lbs, SL, 10x, 2 sets   bridge  10x, 3 sets, w/ abd resistance     squats  Double leg, trX, partial, 10x, 3 stes              Treatment/Session Summary:    · Response to Treatment: Pily Turcios reports step down heel taps are slightly uncomfortable. · Communication/Consultation:  None today  · Equipment provided today:  None today  · Recommendations/Intent for next treatment session: Next visit will focus on progressive right knee stabilization.     Total Treatment Billable Duration:  40 min therex  PT Patient Time In/Time Out  Time In: 1430  Time Out: 5555  Gretta Jon PT    Future Appointments   Date Time Provider Verena Zhu   12/14/2021  4:30 PM Roena Ormond, PT, DPT [facility]   12/16/2021  2:30 PM Arthur Zuleyka, PT [facility]   12/20/2021  2:30 PM Arthur Zuleyka, PT [facility]   12/22/2021  3:15 PM Arthur Zuleyka, PT [facility]   12/27/2021  4:45 PM Roena Ormond, PT, DPT [facility]   12/29/2021  2:30 PM Arthur Zuleyka, PT [facility]   1/3/2022  2:15 PM Roena Ormond, PT, DPT [facility]   1/5/2022  2:30 PM Juancarlos Salinas, PT [facility]

## 2021-12-14 ENCOUNTER — HOSPITAL ENCOUNTER (OUTPATIENT)
Dept: PHYSICAL THERAPY | Age: 56
Discharge: HOME | End: 2021-12-14
Payer: COMMERCIAL

## 2021-12-14 PROCEDURE — 97110 THERAPEUTIC EXERCISES: CPT

## 2021-12-14 NOTE — PROGRESS NOTES
Dago Dalton  : 1965  Primary: Wilfrid West  Secondary:  [address] at [facility]  [place] , [address], [place] [address]  [phone]   [phone]                                  OUTPATIENT PHYSICAL THERAPY: Daily Treatment Note 2021  Visit Count:  5  ICD-10: Treatment Diagnosis: Pain in joint, right knee (M25.561)  Sprain of medial collateral Ligament of right knee,sequela (X13.505K)  Precautions/Allergies:   Amoxil [amoxicillin]   TREATMENT PLAN:  Effective Dates: 2021 TO 2022 (60 days). Frequency/Duration: 2 times a week for 60 Day(s)     MEDICAL/REFERRING DIAGNOSIS:  Tear of medial collateral ligament of right knee, initial encounter [A85.214T]   DATE OF ONSET: 7 weeks ago  REFERRING PHYSICIAN: Tanika Hills MD MD Orders: Era Cast and Treat  Return MD Appointment: not scheduled     Pre-treatment Symptoms/Complaints:  Dago Dalton is a 55 y.o. male reports that he tweaked his knee getting off the couch. States he is frustrated because he was doing so good. Pain: Initial: Pain Intensity 1: 4  Pain Location 1: Knee  Post Session:  4/10   Medications Last Reviewed:  2021  Updated Objective Findings:     TREATMENT:   THERAPEUTIC EXERCISE: (30 minutes):  Exercises per grid below to improve mobility, strength and coordination. Required minimal verbal cues to promote proper body mechanics. Progressed resistance, range and repetitions as indicated. Access Code: [email or web address]  URL: [email or web address] ([email or web address]  Date: 2021  Prepared by:  Rishabh Salinas    Exercises  Supine Quad Set - 2 x daily - 1 sets - 10 reps  Supine Active Straight Leg Raise - 2 x daily - 1 sets - 10 reps  Supine Knee Extension Strengthening - 2 x daily - 1 sets - 10 reps  Supine Active Knee Extension with Hand Support and Leg Straight - 2 x daily - 1 sets - 10 reps  Supine Hip Adduction Isometric with Terris Ousmane - 2 x daily - 1 sets - 10 reps     Date:  11-30-21 Date:  12-2-21 Date:  12-7-21 Date  12-14-21   Activity/Exercise Parameters Parameters Parameters    HEP As above reviewed   Discussed to only do SLR, heel slides and wear his knee hinge brace for the next week or until symptoms calm down   Recumbent stepper   Level 2 x 10' Attempted but patient had increased pain so it was stopped   Step up  4\", 10x, 3 sets, w/ TKE, purple band X 10 BLE    Lat step ups   X 10 BLE -   Ant heel tap   X 10 BLE -   QS   2 x 10 -   SLR   2# 2 x 10 10x, 3 sets, 3lbs   SAQ   2# 2 x 10    Side-lying hip abd  10x, 3 sets, legs locked at 0  2# 2 x 10 -   Shuttle  50lbs, SL, 10x, 3 sets 50lbs, SL, 10x, 2 sets -   bridge  10x, 3 sets, w/ abd resistance  -   squats  Double leg, trX, partial, 10x, 3 stes  -   Heel slides    10x, 2 sets     Treatment/Session Summary:    · Response to Treatment: Erickson Slater had moderate pain with any kind of valgus force as well as pain with compression of the knee joint. Discussed to wear the knee hinged brace, perform SLR and heel slides to keep motions and quad strength. We will continue to progress his plan of care once his symptoms calm down. · Communication/Consultation:  None today  · Equipment provided today:  None today  · Recommendations/Intent for next treatment session: Next visit will focus on progressive right knee stabilization.     Total Treatment Billable Duration:  30 min therex  PT Patient Time In/Time Out  Time In: 1630  Time Out: 201 [facility], PT, DPT    Future Appointments   Date Time Provider Verena Zhu   12/16/2021  2:30 PM Toy Gonzales PT [facility]   12/20/2021  2:30 PM Toy Gonzales PT [facility]   12/22/2021  3:15 PM Toy Gonzales PT [facility]   12/27/2021  4:45 PM Alexx Nichols, PT, DPT [facility]   12/29/2021  2:30 PM Toy Gonzales PT [facility]   1/3/2022  2:15 PM Rad Timmons, PT, DPT [facility]   1/5/2022  2:30 PM Belén Ortega, JEAN CORTES [facility]

## 2021-12-16 ENCOUNTER — HOSPITAL ENCOUNTER (OUTPATIENT)
Dept: PHYSICAL THERAPY | Age: 56
Discharge: HOME | End: 2021-12-16
Payer: COMMERCIAL

## 2021-12-16 PROCEDURE — 97110 THERAPEUTIC EXERCISES: CPT

## 2021-12-16 NOTE — PROGRESS NOTES
Indy Marvin  : 1965  Primary: Tim South   Secondary:  [address] at [facility]  [place] , [address], [place] [address]  [phone]   [phone]                                  OUTPATIENT PHYSICAL THERAPY: Daily Treatment Note 2021  Visit Count:  6  ICD-10: Treatment Diagnosis: Pain in joint, right knee (M25.561)  Sprain of medial collateral Ligament of right knee,sequela (Z52.647U)  Precautions/Allergies:   Amoxil [amoxicillin]   TREATMENT PLAN:  Effective Dates: 2021 TO 2022 (60 days). Frequency/Duration: 2 times a week for 60 Day(s)     MEDICAL/REFERRING DIAGNOSIS:  Tear of medial collateral ligament of right knee, initial encounter [P45.692Q]   DATE OF ONSET: 7 weeks ago  REFERRING PHYSICIAN: Walter Chun MD MD Orders: Sherrie [place] and Treat  Return MD Appointment: not scheduled     Pre-treatment Symptoms/Complaints:  Indy Marvin reports his knee feels much better than last session. He wore his brace for a couple of days. He reports walking 1 mile yesterday. Pain: Initial: Pain Intensity 1: 1  Pain Location 1: Knee  Post Session:  1/10   Medications Last Reviewed:  2021  Updated Objective Findings:     TREATMENT:   THERAPEUTIC EXERCISE: (30 minutes):  Exercises per grid below to improve mobility, strength and coordination. Required minimal verbal cues to promote proper body mechanics. Progressed resistance, range and repetitions as indicated. Access Code: [email or web address]  URL: [email or web address]  Date: 2021  Prepared by:  Rishabh Salinas    Exercises  Supine Quad Set - 2 x daily - 1 sets - 10 reps  Supine Active Straight Leg Raise - 2 x daily - 1 sets - 10 reps  Supine Knee Extension Strengthening - 2 x daily - 1 sets - 10 reps  Supine Active Knee Extension with Hand Support and Leg Straight - 2 x daily - 1 sets - 10 reps  Supine Hip Adduction Isometric with Marquez Meg - 2 x daily - 1 sets - 10 reps     Date:  11-30-21 Date:  12-2-21 Date:  12-7-21 Date  12-14-21    Activity/Exercise Parameters Parameters Parameters     HEP As above reviewed   Discussed to only do SLR, heel slides and wear his knee hinge brace for the next week or until symptoms calm down    Recumbent stepper   Level 2 x 10' Attempted but patient had increased pain so it was stopped Level 2 x 10'   Step up  4\", 10x, 3 sets, w/ TKE, purple band X 10 BLE  X 10 BLE   Lat step ups   X 10 BLE - X 10 BLE   Ant heel tap   X 10 BLE - X 10 BLE   QS   2 x 10 -    SLR   2# 2 x 10 10x, 3 sets, 3lbs    SAQ   2# 2 x 10     Side-lying hip abd  10x, 3 sets, legs locked at 0  2# 2 x 10 -    Shuttle  50lbs, SL, 10x, 3 sets 50lbs, SL, 10x, 2 sets - 75# x 15 BLE  50# x 15 SLE   bridge  10x, 3 sets, w/ abd resistance  -    squats  Double leg, trX, partial, 10x, 3 stes  -    Heel slides    10x, 2 sets    Heel/toe raises     Foam pad x 10   Hip add/abd     Foam pad x 10   BOSU     Lateral weight shifts  X 10   Supine hip add     Ball squeeze x 10     Treatment/Session Summary:    · Response to Treatment: Dago Dalton is encouraged to modify his heel taps to 1/2 range. · Communication/Consultation:  None today  · Equipment provided today:  None today  · Recommendations/Intent for next treatment session: Next visit will focus on progressive right knee stabilization.     Total Treatment Billable Duration:  40 min therex  PT Patient Time In/Time Out  Time In: 1430  Time Out: 1515  Yasmine Mosley PT    Future Appointments   Date Time Provider Verena Zhu   12/20/2021  2:30 PM Dahlia Mccarthy PT [facility]   12/22/2021  3:15 PM Dahlia Mccarthy PT [facility]   12/27/2021  4:45 PM Justyna Cobb PT, DPT [facility]   12/29/2021  2:30 PM [facility]   1/3/2022  2:15 PM Justyna Cobb PT, DPT [facility]   1/5/2022  2:30 PM Phillip Morales, PT [facility]

## 2021-12-20 ENCOUNTER — HOSPITAL ENCOUNTER (OUTPATIENT)
Dept: PHYSICAL THERAPY | Age: 56
Discharge: HOME | End: 2021-12-20
Payer: COMMERCIAL

## 2021-12-20 PROCEDURE — 97110 THERAPEUTIC EXERCISES: CPT

## 2021-12-21 NOTE — PROGRESS NOTES
Lisa Fong  : 1965  Primary: Axel West  Secondary:  [address] at [facility]  [place] , [address], [place] [address]  [phone]   [phone]                                  OUTPATIENT PHYSICAL THERAPY: Daily Treatment Note 2021  Visit Count:  7  ICD-10: Treatment Diagnosis: Pain in joint, right knee (M25.561)  Sprain of medial collateral Ligament of right knee,sequela (X01.193D)  Precautions/Allergies:   Amoxil [amoxicillin]   TREATMENT PLAN:  Effective Dates: 2021 TO 2022 (60 days). Frequency/Duration: 2 times a week for 60 Day(s)     MEDICAL/REFERRING DIAGNOSIS:  Tear of medial collateral ligament of right knee, initial encounter [A05.707K]   DATE OF ONSET: 7 weeks ago  REFERRING PHYSICIAN: Gustavo Santos MD MD Orders: Cliff Childers and Treat  Return MD Appointment: not scheduled     Pre-treatment Symptoms/Complaints:  Lisa Fong reports he still gets occasional \"pops\" and pain in his knee. Pain: Initial: Pain Intensity 1: 1  Pain Location 1: Knee  Post Session:  1/10   Medications Last Reviewed:  2021  Updated Objective Findings:     TREATMENT:   THERAPEUTIC EXERCISE: (30 minutes):  Exercises per grid below to improve mobility, strength and coordination. Required minimal verbal cues to promote proper body mechanics. Progressed resistance, range and repetitions as indicated. Access Code: [email or web address]  URL: [email or web address]  Date: 2021  Prepared by:  Rishabh Salinas    Exercises  Supine Quad Set - 2 x daily - 1 sets - 10 reps  Supine Active Straight Leg Raise - 2 x daily - 1 sets - 10 reps  Supine Knee Extension Strengthening - 2 x daily - 1 sets - 10 reps  Supine Active Knee Extension with Hand Support and Leg Straight - 2 x daily - 1 sets - 10 reps  Supine Hip Adduction Isometric with Ball - 2 x daily - 1 sets - 10 reps Date:  11-30-21 Date:  12-2-21 Date:  12-7-21 Date  12-14-21 Date  12-16-21 Date  12-21-21   Activity/Exercise Parameters Parameters Parameters      HEP As above reviewed   Discussed to only do SLR, heel slides and wear his knee hinge brace for the next week or until symptoms calm down     Recumbent stepper   Level 2 x 10' Attempted but patient had increased pain so it was stopped Level 2 x 10' Level 2 x 10'   Step up  4\", 10x, 3 sets, w/ TKE, purple band X 10 BLE  X 10 BLE X 10 BLE   Lat step ups   X 10 BLE - X 10 BLE X 10 BLE   Ant heel tap   X 10 BLE - X 10 BLE X 10 BLE   QS   2 x 10 -     SLR   2# 2 x 10 10x, 3 sets, 3lbs     SAQ   2# 2 x 10      Side-lying hip abd  10x, 3 sets, legs locked at 0  2# 2 x 10 -     Shuttle  50lbs, SL, 10x, 3 sets 50lbs, SL, 10x, 2 sets - 75# x 15 BLE  50# x 15 SLE 75# x 15 BLE  50# x 15 SLE   bridge  10x, 3 sets, w/ abd resistance  -     squats  Double leg, trX, partial, 10x, 3 stes  -     Heel slides    10x, 2 sets     Heel/toe raises     Foam pad x 10 Foam pad x 10   Hip add/abd     Foam pad x 10 Foam pad x 10   BOSU     Lateral weight shifts  X 10 Lateral weight shifts  X 10   Supine hip add     Ball squeeze x 10      Treatment/Session Summary:    · Response to Treatment: Geeta Salazar still has difficulty with going down stairs. · Communication/Consultation:  None today  · Equipment provided today:  None today  · Recommendations/Intent for next treatment session: Next visit will focus on progressive right knee stabilization.     Total Treatment Billable Duration:  40 min therex  PT Patient Time In/Time Out  Time In: 1430  Time Out: 1515  Kwame Padilla PT    Future Appointments   Date Time Provider Verena Zhu   12/22/2021  3:15 PM Lucio Khoury PT [facility]   12/27/2021  4:45 PM Lisbet Davenport PT, DPT [facility]   12/29/2021  2:30 PM Lucio Khoury PT [facility]   1/3/2022  2:15 PM Lisbet Davenport PT, DPT [facility]   1/5/2022  2:30 PM Bisi Salinas, [facility]

## 2021-12-22 ENCOUNTER — HOSPITAL ENCOUNTER (OUTPATIENT)
Dept: PHYSICAL THERAPY | Age: 56
Discharge: HOME | End: 2021-12-22
Payer: COMMERCIAL

## 2021-12-22 PROCEDURE — 97110 THERAPEUTIC EXERCISES: CPT

## 2021-12-27 ENCOUNTER — HOSPITAL ENCOUNTER (OUTPATIENT)
Dept: PHYSICAL THERAPY | Age: 56
Discharge: HOME | End: 2021-12-27
Payer: COMMERCIAL

## 2021-12-27 PROCEDURE — 97110 THERAPEUTIC EXERCISES: CPT

## 2021-12-27 NOTE — PROGRESS NOTES
Pily Turcios  : 1965  Primary: Amirah Garcia [facility]  Secondary:  [address] at [facility]  [place] , [address], [place] [address]  [phone]   [phone]                                  OUTPATIENT PHYSICAL THERAPY: Daily Treatment Note 2021  Visit Count:  8  ICD-10: Treatment Diagnosis: Pain in joint, right knee (M25.561)  Sprain of medial collateral Ligament of right knee,sequela (A84.408V)  Precautions/Allergies:   Amoxil [amoxicillin]   TREATMENT PLAN:  Effective Dates: 2021 TO 2022 (60 days). Frequency/Duration: 2 times a week for 60 Day(s)     MEDICAL/REFERRING DIAGNOSIS:  Tear of medial collateral ligament of right knee, initial encounter [T00.604F]   DATE OF ONSET: 7 weeks ago  REFERRING PHYSICIAN: Julian Obrien MD MD Orders: Stephanie Abad and Treat  Return MD Appointment: not scheduled     Pre-treatment Symptoms/Complaints:  Pily Turcios reports incident of \"pops\" and pain in his knee when pushing on brake pedal in his car. Pain: Initial: Pain Intensity 1: 1  Pain Location 1: Knee  Post Session:  1/10   Medications Last Reviewed:  2021  Updated Objective Findings:     TREATMENT:   THERAPEUTIC EXERCISE: (30 minutes):  Exercises per grid below to improve mobility, strength and coordination. Required minimal verbal cues to promote proper body mechanics. Progressed resistance, range and repetitions as indicated. Access Code: [email or web address]  URL: [email or web address]  Date: 2021  Prepared by:  Rishabh Salinas    Exercises  Supine Quad Set - 2 x daily - 1 sets - 10 reps  Supine Active Straight Leg Raise - 2 x daily - 1 sets - 10 reps  Supine Knee Extension Strengthening - 2 x daily - 1 sets - 10 reps  Supine Active Knee Extension with Hand Support and Leg Straight - 2 x daily - 1 sets - 10 reps  Supine Hip Adduction Isometric with Ball - 2 x daily - 1 sets - 10 reps     Date:  11-30-21 Date:  12-2-21 Date:  12-7-21 Date  12-14-21 Date  12-16-21 Date  12-20-21 Date  12-22-21   Activity/Exercise Parameters Parameters Parameters       HEP As above reviewed   Discussed to only do SLR, heel slides and wear his knee hinge brace for the next week or until symptoms calm down      Recumbent stepper   Level 2 x 10' Attempted but patient had increased pain so it was stopped Level 2 x 10' Level 2 x 10' Level 2 x 10'   Step up  4\", 10x, 3 sets, w/ TKE, purple band X 10 BLE  X 10 BLE X 10 BLE X 10 BLE  Also x 10 step up with high knee   Lat step ups   X 10 BLE - X 10 BLE X 10 BLE X 10 BLE   Ant heel tap   X 10 BLE - X 10 BLE X 10 BLE X 10 BLE   QS   2 x 10 -      SLR   2# 2 x 10 10x, 3 sets, 3lbs      SAQ   2# 2 x 10       Side-lying hip abd  10x, 3 sets, legs locked at 0  2# 2 x 10 -      Shuttle  50lbs, SL, 10x, 3 sets 50lbs, SL, 10x, 2 sets - 75# x 15 BLE  50# x 15 SLE 75# x 15 BLE  50# x 15 SLE 75# x 15 BLE  50# x 15 SLE  Also worked SLE slow ankle plan/dorsiflexion   bridge  10x, 3 sets, w/ abd resistance  -      squats  Double leg, trX, partial, 10x, 3 stes  -      Heel slides    10x, 2 sets      Heel/toe raises     Foam pad x 10 Foam pad x 10 Foam pad x 10   Hip add/abd     Foam pad x 10 Foam pad x 10 Foam pad x 10   BOSU     Lateral weight shifts  X 10 Lateral weight shifts  X 10 Lateral weight shifts  X 10   Supine hip add     Ball squeeze x 10       Treatment/Session Summary:    · Response to Treatment: Pily Turcios continues to work on knee stability. · Communication/Consultation:  None today  · Equipment provided today:  None today  · Recommendations/Intent for next treatment session: Next visit will focus on progressive right knee stabilization.     Total Treatment Billable Duration:  40 min therex  PT Patient Time In/Time Out  Time In: 1369  Time Out: 1600  Gretta Jon PT    Future Appointments   Date Time Provider Verena Zhu   12/27/2021 4:45 PM [facility]igi Brandon[facility]   12/29/2021  2:30 PM [facility] Rolon[facility]   1/3/2022  2:15 PM [facility]igi Brandon [facility], [facility]   1/5/2022  2:30 PM Mihir Alva, Kathleen Gaston, PT [facility]

## 2021-12-27 NOTE — PROGRESS NOTES
Lay Harry  : 1965  Primary: Mary West  Secondary:  [address] at [facility]  [place] , [address], [place]  [phone]   [phone]                                  OUTPATIENT PHYSICAL THERAPY: Daily Treatment Note 2021  Visit Count:  9  ICD-10: Treatment Diagnosis: Pain in joint, right knee (M25.561)  Sprain of medial collateral Ligament of right knee,sequela (J15.569C)  Precautions/Allergies:   Amoxil [amoxicillin]   TREATMENT PLAN:  Effective Dates: 2021 TO 2022 (60 days). Frequency/Duration: 2 times a week for 60 Day(s)     MEDICAL/REFERRING DIAGNOSIS:  Tear of medial collateral ligament of right knee, initial encounter [V08.079Z]   DATE OF ONSET: 7 weeks ago  REFERRING PHYSICIAN: Tory Romeo MD MD Orders: Cristopher Bello and Treat  Return MD Appointment: not scheduled     Pre-treatment Symptoms/Complaints:  Lay Harry reports he is doing okay but driving is still hard. He has to keep his leg locked and a varus moment to drive pain-free. Pain: Initial: Pain Intensity 1: 1  Pain Location 1: Knee  Post Session:  1/10   Medications Last Reviewed:  2021  Updated Objective Findings:     TREATMENT:   THERAPEUTIC EXERCISE: (45 minutes):  Exercises per grid below to improve mobility, strength and coordination. Required minimal verbal cues to promote proper body mechanics. Progressed resistance, range and repetitions as indicated. Access Code: [email or web address]  URL: [email or web address]  Date: 2021  Prepared by:  Rishabh Salinas    Exercises  Supine Quad Set - 2 x daily - 1 sets - 10 reps  Supine Active Straight Leg Raise - 2 x daily - 1 sets - 10 reps  Supine Knee Extension Strengthening - 2 x daily - 1 sets - 10 reps  Supine Active Knee Extension with Hand Support and Leg Straight - 2 x daily - 1 sets - 10 reps  Supine Hip Adduction Isometric with Allison Grimaldo - 2 x daily - 1 sets - 10 reps     Date:  11-30-21 Date:  12-2-21 Date:  12-7-21 Date  12-14-21 Date  12-16-21 Date  12-20-21 Date  12-22-21 Date  12-27-21   Activity/Exercise Parameters Parameters Parameters        HEP As above reviewed   Discussed to only do SLR, heel slides and wear his knee hinge brace for the next week or until symptoms calm down    Discussed HEP, plan of care   Recumbent stepper   Level 2 x 10' Attempted but patient had increased pain so it was stopped Level 2 x 10' Level 2 x 10' Level 2 x 10'    Step up  4\", 10x, 3 sets, w/ TKE, purple band X 10 BLE  X 10 BLE X 10 BLE X 10 BLE  Also x 10 step up with high knee 10x, 3 sets, purple band TKE resistance,    Lat step ups   X 10 BLE - X 10 BLE X 10 BLE X 10 BLE x10  BLE, 6\"   Ant heel tap   X 10 BLE - X 10 BLE X 10 BLE X 10 BLE    QS   2 x 10 -       SLR   2# 2 x 10 10x, 3 sets, 3lbs       SAQ   2# 2 x 10        Side-lying hip abd  10x, 3 sets, legs locked at 0  2# 2 x 10 -       Shuttle  50lbs, SL, 10x, 3 sets 50lbs, SL, 10x, 2 sets - 75# x 15 BLE  50# x 15 SLE 75# x 15 BLE  50# x 15 SLE 75# x 15 BLE  50# x 15 SLE  Also worked SLE slow ankle plan/dorsiflexion 75#,15 BLE   bridge  10x, 3 sets, w/ abd resistance  -       squats  Double leg, trX, partial, 10x, 3 stes  -    trx squats, SL, 10x, 3 sets   Heel slides    10x, 2 sets    10x   Heel/toe raises     Foam pad x 10 Foam pad x 10 Foam pad x 10    Hip add/abd     Foam pad x 10 Foam pad x 10 Foam pad x 10    BOSU     Lateral weight shifts  X 10 Lateral weight shifts  X 10 Lateral weight shifts  X 10    Supine hip add     Ball squeeze x 10   Side-lying, 10x, 3 sets   SL balance        Partial deadlift, 10x, 3 sets     Treatment/Session Summary:    · Response to Treatment: Indy Marvin tolerated the session well but did have a couple moments of increased pain. Added increased resistance to adductor strength and SL stability. Reviewed plan of care. · Communication/Consultation:  None today  · Equipment provided today:  None today  · Recommendations/Intent for next treatment session: Next visit will focus on progressive right knee stabilization.     Total Treatment Billable Duration:  45 min therex  PT Patient Time In/Time Out  Time In: 1435  Time Out: 6700  10 West, PT, DPT    Future Appointments   Date Time Provider Verena Zhu   12/29/2021  2:30 PM Arthur Gardiner, JEAN [facility]   1/3/2022  2:15 PM Roena Ormond, PT, DPT [facility]   1/5/2022  2:30 PM Juancarlos Salinas, PT [facility]

## 2021-12-29 ENCOUNTER — HOSPITAL ENCOUNTER (OUTPATIENT)
Dept: PHYSICAL THERAPY | Age: 56
Discharge: HOME | End: 2021-12-29
Payer: COMMERCIAL

## 2021-12-29 PROCEDURE — 97110 THERAPEUTIC EXERCISES: CPT

## 2021-12-29 NOTE — PROGRESS NOTES
Angela Quinonez  : 1965  Primary: Myron West  Secondary:  [address] at [facility]  [place] , [address], [place] [address]  [phone]   [phone]                                  OUTPATIENT PHYSICAL THERAPY: Daily Treatment Note 2021  Visit Count:  10  ICD-10: Treatment Diagnosis: Pain in joint, right knee (M25.561)  Sprain of medial collateral Ligament of right knee,sequela (D63.262E)  Precautions/Allergies:   Amoxil [amoxicillin]   TREATMENT PLAN:  Effective Dates: 2021 TO 2022 (60 days). Frequency/Duration: 2 times a week for 60 Day(s)     MEDICAL/REFERRING DIAGNOSIS:  Tear of medial collateral ligament of right knee, initial encounter [U44.417W]   DATE OF ONSET: 7 weeks ago  REFERRING PHYSICIAN: Nenita Oliver MD MD Orders: Paige Aguilar and Treat  Return MD Appointment: not scheduled     Pre-treatment Symptoms/Complaints:  Angela Quinonez reports overall he feels he has made steady improvement, but does continue to have occasional brief episodes of pain in the knee. Pain: Initial: Pain Intensity 1: 1  Pain Location 1: Knee  Post Session:  1/10   Medications Last Reviewed:  2021  Updated Objective Findings:     TREATMENT:   THERAPEUTIC EXERCISE: (45 minutes):  Exercises per grid below to improve mobility, strength and coordination. Required minimal verbal cues to promote proper body mechanics. Progressed resistance, range and repetitions as indicated. Access Code: [email or web address]  URL: [email or web address]  Date: 2021  Prepared by:  Rishabh Salinas    Exercises  Supine Quad Set - 2 x daily - 1 sets - 10 reps  Supine Active Straight Leg Raise - 2 x daily - 1 sets - 10 reps  Supine Knee Extension Strengthening - 2 x daily - 1 sets - 10 reps  Supine Active Knee Extension with Hand Support and Leg Straight - 2 x daily - 1 sets - 10 reps  Supine Hip Adduction Isometric with Margarita Sanchez - 2 x daily - 1 sets - 10 reps     Date:  12-7-21 Date  12-14-21 Date  12-16-21 Date  12-20-21 Date  12-22-21 Date  12-27-21 Date  12-29-21   Activity/Exercise Parameters         HEP  Discussed to only do SLR, heel slides and wear his knee hinge brace for the next week or until symptoms calm down    Discussed HEP, plan of care    Recumbent stepper Level 2 x 10' Attempted but patient had increased pain so it was stopped Level 2 x 10' Level 2 x 10' Level 2 x 10'  Level 2 x 10'   Step up X 10 BLE  X 10 BLE X 10 BLE X 10 BLE  Also x 10 step up with high knee 10x, 3 sets, purple band TKE resistance,  X 10 BLE  Also x 10 step up with high knee   Lat step ups X 10 BLE - X 10 BLE X 10 BLE X 10 BLE x10  BLE, 6\" X 10 BLE   Ant heel tap X 10 BLE - X 10 BLE X 10 BLE X 10 BLE  X 10 BLE   QS 2 x 10 -        SLR 2# 2 x 10 10x, 3 sets, 3lbs        SAQ 2# 2 x 10         Side-lying hip abd 2# 2 x 10 -        Shuttle 50lbs, SL, 10x, 2 sets - 75# x 15 BLE  50# x 15 SLE 75# x 15 BLE  50# x 15 SLE 75# x 15 BLE  50# x 15 SLE  Also worked SLE slow ankle plan/dorsiflexion 75#,15 BLE 75# x 15 BLE  50# x 15 SLE  Also worked SLE slow ankle plan/dorsiflexion   bridge  -        squats  -    trx squats, SL, 10x, 3 sets    Heel slides  10x, 2 sets    10x    Heel/toe raises   Foam pad x 10 Foam pad x 10 Foam pad x 10  Foam pad x 10   Hip add/abd   Foam pad x 10 Foam pad x 10 Foam pad x 10  Foam pad x 10   BOSU   Lateral weight shifts  X 10 Lateral weight shifts  X 10 Lateral weight shifts  X 10  Lateral weight shifts  X 10   Supine hip add   Ball squeeze x 10   Side-lying, 10x, 3 sets Ball squeeze x 10   SL balance      Partial deadlift, 10x, 3 sets    TB ankle 4 way       OTB 4 x 10     Treatment/Session Summary:    · Response to Treatment: Marilee Torres has the most discomfort/difficulty with the controlled ankle plantar/dorsiflexion on the shuttle and TB ankle eversion.   · Communication/Consultation:  None today  · Equipment provided today:  None today  · Recommendations/Intent for next treatment session: Next visit will focus on progressive right knee stabilization.     Total Treatment Billable Duration:  45 min therex  PT Patient Time In/Time Out  Time In: 1430  Time Out: 1515  Alcira Goldsmith PT    Future Appointments   Date Time Provider Verena Zhu   1/3/2022  2:15 PM Fatimah Simon[facility], [facility]PT [facility]   1/5/2022  2:30 PM Alexander Salinas, PT [facility]

## 2022-01-03 ENCOUNTER — HOSPITAL ENCOUNTER (OUTPATIENT)
Dept: PHYSICAL THERAPY | Age: 57
Discharge: HOME | End: 2022-01-03
Payer: COMMERCIAL

## 2022-01-03 NOTE — PROGRESS NOTES
Trae Roy  : 1965  Primary: Naeem West  Secondary:  [address]  at [facility]  [address] , [address], [place] [address]  [phone]   [phone]        OUTPATIENT DAILY NOTE    NAME/AGE/GENDER: Trae Roy is a 55 y.o. male. DATE: 1/3/2022    Mr. Jarred Pulido for today's appointment due to a schedule conflict.     Lucia Kat, PT, DPT

## 2022-01-05 ENCOUNTER — HOSPITAL ENCOUNTER (OUTPATIENT)
Dept: PHYSICAL THERAPY | Age: 57
Discharge: HOME | End: 2022-01-05
Payer: COMMERCIAL

## 2022-01-05 PROCEDURE — 97110 THERAPEUTIC EXERCISES: CPT

## 2022-01-06 NOTE — THERAPY DISCHARGE
Yakov Coreas  : 1965  Primary: Mayra West  Secondary:  [address] at [facility]  [place] , [address], [place] [address]  [phone]   [phone]        OUTPATIENT PHYSICAL THERAPY:Discharge 2022    ICD-10: Treatment Diagnosis: Pain in joint, right knee (M25.561)  Sprain of medial collateral Ligament of right knee,sequela (H85.329L)  Precautions/Allergies:   Amoxil [amoxicillin]   TREATMENT PLAN:  Effective Dates: 2021 TO 2022 (60 days). Frequency/Duration: 2 times a week for 60 Day(s)     MEDICAL/REFERRING DIAGNOSIS:  Sprain of medial collateral ligament of right knee, initial encounter [I31.864E]   DATE OF ONSET: 7 weeks ago  REFERRING PHYSICIAN: Esequiel Muñoz MD MD Orders: Beatriz Manley and Treat  Return MD Appointment: not scheduled   ATTENDANCE: As of ***, Yakov Coreas has attended {NUMBERS 0-12:31658} out of {NUMBERS 0-12:15135} scheduled visits, with {NUMBERS 0-12:74786} cancellation(s) and {NUMBERS 0-12:91652} no shows. INITIAL ASSESSMENT:  Yakov Coreas is a 55 y.o. male who presents with complaints of pain and feeling instability in his right knee. Patient states he was walking across grass yard about 7 weeks ago, when he felt \"pop\" in right knee with sudden onset of pain in medial right knee. X-[facility] at [facility] were negative, but pain continued. He was seen by Dr. David Thornton at [facility], and MRI indicated MCL tear and \"roughing of edges consistent with possible previous scope. Yakov Coreas does report previous scope of right knee. He was placed in knee immobilizer and referred to PT. Pt may benefit from PT to address the following problem list.   PROBLEM LIST (Impacting functional limitations):  1. Decreased Strength  2. Decreased ADL/Functional Activities  3. Increased Pain INTERVENTIONS PLANNED:  1. Cryotherapy  2. Electrical Stimulation  3. Home Exercise Program (HEP)  4. Manual Therapy  5.  Therapeutic Exercise/Strengthening  6. Vasopnuematic compression     GOALS: (Goals have been discussed and agreed upon with patient.)  Short-Term Functional Goals: Time Frame: 3 weeks  1. Independent in initial HEP  2. minimal TTP medial joint line  3. Non antalgic gait  Discharge Goals: Time Frame: 6 weeks  1. Independent in advanced HEP  2. Return to painfree ADL's  CLINICAL DECISION MAKING:   Outcome Measure: Tool Used: Lower Extremity Functional Scale (LEFS)  Score:  Initial: 49/80   1-30-21 Most Recent: X/80 (Date: -- )   Interpretation of Score: 20 questions each scored on a 5 point scale with 0 representing \"extreme difficulty or unable to perform\" and 4 representing \"no difficulty\". The lower the score, the greater the functional disability. 80/80 represents no disability. Minimal detectable change is 9 points. Medical Necessity:   · Patient demonstrates good rehab potential due to higher previous functional level. · Pain limiting activities  Reason for Services/Other Comments:  · Patient continues to require modification of therapeutic interventions to increase complexity of exercises. PT Patient Time In/Time Out  Time In: 1430  Time Out: 1515  Rehabilitation Potential For Stated Goals: Excellent  Regarding Regina Nava's therapy, I certify that the treatment plan above will be carried out by a therapist or under their direction.   Thank you for this referral,  Walker Stover, PT

## 2022-01-06 NOTE — PROGRESS NOTES
Aneudy James  : 1965  Primary: Kenneth West  Secondary:  [address] at [facility]  [place] , [address], [place] [address]  [phone]   [phone]                                  OUTPATIENT PHYSICAL THERAPY: Daily Treatment Note 2022  Visit Count:  11  ICD-10: Treatment Diagnosis: Pain in joint, right knee (M25.561)  Sprain of medial collateral Ligament of right knee,sequela (O89.052P)  Precautions/Allergies:   Amoxil [amoxicillin]   TREATMENT PLAN:  Effective Dates: 2021 TO 2022 (60 days). Frequency/Duration: 2 times a week for 60 Day(s)     MEDICAL/REFERRING DIAGNOSIS:  Tear of medial collateral ligament of right knee, initial encounter [Z08.115J]   DATE OF ONSET: 7 weeks ago  REFERRING PHYSICIAN: Alberto Barraza MD MD Orders: Reshma Pace and Treat  Return MD Appointment: not scheduled     Pre-treatment Symptoms/Complaints:  Aneudy James reports overall he feels he has made steady improvement. He reports occasional brief episodes of pain in the knee. Pain: Initial: Pain Intensity 1: 1  Pain Location 1: Knee  Post Session:  1/10   Medications Last Reviewed:  2022  Updated Objective Findings:     TREATMENT:   THERAPEUTIC EXERCISE: (45 minutes):  Exercises per grid below to improve mobility, strength and coordination. Required minimal verbal cues to promote proper body mechanics. Progressed resistance, range and repetitions as indicated. Access Code: [email or web address]  URL: [email or web address]  Date: 2021  Prepared by:  Rishabh Salinas    Exercises  Supine Quad Set - 2 x daily - 1 sets - 10 reps  Supine Active Straight Leg Raise - 2 x daily - 1 sets - 10 reps  Supine Knee Extension Strengthening - 2 x daily - 1 sets - 10 reps  Supine Active Knee Extension with Hand Support and Leg Straight - 2 x daily - 1 sets - 10 reps  Supine Hip Adduction Isometric with Claribel Collier - 2 x daily - 1 sets - 10 reps     Date  12-14-21 Date  12-16-21 Date  12-20-21 Date  12-22-21 Date  12-27-21 Date  12-29-21 Date  1-5--22   Activity/Exercise          HEP Discussed to only do SLR, heel slides and wear his knee hinge brace for the next week or until symptoms calm down    Discussed HEP, plan of care     Recumbent stepper Attempted but patient had increased pain so it was stopped Level 2 x 10' Level 2 x 10' Level 2 x 10'  Level 2 x 10' Level 2 x 10'   Step up  X 10 BLE X 10 BLE X 10 BLE  Also x 10 step up with high knee 10x, 3 sets, purple band TKE resistance,  X 10 BLE  Also x 10 step up with high knee X 10 BLE  Also x 10 step up with high knee   Lat step ups - X 10 BLE X 10 BLE X 10 BLE x10  BLE, 6\" X 10 BLE X 10 BLE   Ant heel tap - X 10 BLE X 10 BLE X 10 BLE  X 10 BLE X 10 BLE   QS -         SLR 10x, 3 sets, 3lbs         SAQ          Side-lying hip abd -         Shuttle - 75# x 15 BLE  50# x 15 SLE 75# x 15 BLE  50# x 15 SLE 75# x 15 BLE  50# x 15 SLE  Also worked SLE slow ankle plan/dorsiflexion 75#,15 BLE 75# x 15 BLE  50# x 15 SLE  Also worked SLE slow ankle plan/dorsiflexion 75# x 15 BLE  50# x 15 SLE  Also worked SLE slow ankle plan/dorsiflexion  Jumps 50# x 10   bridge -         squats -    trx squats, SL, 10x, 3 sets     Heel slides 10x, 2 sets    10x     Heel/toe raises  Foam pad x 10 Foam pad x 10 Foam pad x 10  Foam pad x 10 Foam pad x 10   Hip add/abd  Foam pad x 10 Foam pad x 10 Foam pad x 10  Foam pad x 10 Foam pad x 10   BOSU  Lateral weight shifts  X 10 Lateral weight shifts  X 10 Lateral weight shifts  X 10  Lateral weight shifts  X 10 Lateral weight shifts  X 10   Supine hip add  Ball squeeze x 10   Side-lying, 10x, 3 sets Ball squeeze x 10    SL balance     Partial deadlift, 10x, 3 sets  Partial deadlift, 10x,12# 2 sets   TB ankle 4 way      OTB 4 x 10      Treatment/Session Summary:    · Response to Treatment: Vickiely Ficks reporting episodes of pain are now very infrequent and short duration. · Communication/Consultation:  None today  · Equipment provided today:  None today  · Recommendations/Intent for next treatment session: López Alvarado states he is comfortable with D/C to independent HEP. Total Treatment Billable Duration:  45 min therex  PT Patient Time In/Time Out  Time In: 1430  Time Out: 1515  Musa Salinas, PT    No future appointments.

## 2023-01-27 ENCOUNTER — OFFICE VISIT (OUTPATIENT)
Dept: FAMILY MEDICINE CLINIC | Facility: CLINIC | Age: 58
End: 2023-01-27

## 2023-01-27 VITALS
WEIGHT: 175 LBS | SYSTOLIC BLOOD PRESSURE: 128 MMHG | DIASTOLIC BLOOD PRESSURE: 88 MMHG | HEIGHT: 68 IN | BODY MASS INDEX: 26.52 KG/M2

## 2023-01-27 DIAGNOSIS — Z00.00 ROUTINE GENERAL MEDICAL EXAMINATION AT A HEALTH CARE FACILITY: ICD-10-CM

## 2023-01-27 DIAGNOSIS — J20.9 ACUTE BRONCHITIS, UNSPECIFIED ORGANISM: Primary | ICD-10-CM

## 2023-01-27 DIAGNOSIS — R06.2 WHEEZING: ICD-10-CM

## 2023-01-27 RX ORDER — HYDROCODONE BITARTRATE AND HOMATROPINE METHYLBROMIDE ORAL SOLUTION 5; 1.5 MG/5ML; MG/5ML
5 LIQUID ORAL EVERY 6 HOURS PRN
Qty: 140 ML | Refills: 0 | Status: SHIPPED | OUTPATIENT
Start: 2023-01-27 | End: 2023-02-03

## 2023-01-27 RX ORDER — LEVOFLOXACIN 750 MG/1
750 TABLET ORAL DAILY
Qty: 7 TABLET | Refills: 0 | Status: SHIPPED | OUTPATIENT
Start: 2023-01-27 | End: 2023-02-03

## 2023-01-27 RX ORDER — ALBUTEROL SULFATE 90 UG/1
2 AEROSOL, METERED RESPIRATORY (INHALATION) EVERY 6 HOURS PRN
Qty: 1 EACH | Refills: 11 | Status: SHIPPED | OUTPATIENT
Start: 2023-01-27

## 2023-01-27 RX ORDER — ALBUTEROL SULFATE 2.5 MG/3ML
2.5 SOLUTION RESPIRATORY (INHALATION) ONCE
Status: COMPLETED | OUTPATIENT
Start: 2023-01-27 | End: 2023-01-27

## 2023-01-27 RX ORDER — GUAIFENESIN AND DEXTROMETHORPHAN HYDROBROMIDE 1200; 60 MG/1; MG/1
1 TABLET, EXTENDED RELEASE ORAL EVERY 12 HOURS
Qty: 20 TABLET | Refills: 0 | Status: SHIPPED | OUTPATIENT
Start: 2023-01-27 | End: 2023-02-06

## 2023-01-27 RX ADMIN — ALBUTEROL SULFATE 2.5 MG: 2.5 SOLUTION RESPIRATORY (INHALATION) at 16:29

## 2023-01-27 ASSESSMENT — ENCOUNTER SYMPTOMS
VOMITING: 0
WHEEZING: 1
NAUSEA: 0
COUGH: 1
RHINORRHEA: 0
CHEST TIGHTNESS: 1
SHORTNESS OF BREATH: 1
SINUS PAIN: 0

## 2023-01-27 ASSESSMENT — PATIENT HEALTH QUESTIONNAIRE - PHQ9
SUM OF ALL RESPONSES TO PHQ QUESTIONS 1-9: 0
SUM OF ALL RESPONSES TO PHQ QUESTIONS 1-9: 0
1. LITTLE INTEREST OR PLEASURE IN DOING THINGS: 0
SUM OF ALL RESPONSES TO PHQ QUESTIONS 1-9: 0
SUM OF ALL RESPONSES TO PHQ9 QUESTIONS 1 & 2: 0
2. FEELING DOWN, DEPRESSED OR HOPELESS: 0
SUM OF ALL RESPONSES TO PHQ QUESTIONS 1-9: 0

## 2023-01-27 NOTE — PROGRESS NOTES
PROGRESS NOTE    SUBJECTIVE:   Lakisha Silverman is a 65 y.o. male seen for a follow up visit regarding the following chief complaint:     Chief Complaint   Patient presents with    Chest Congestion     6 days of chest congestion, productive cough, yellowish sputum, low grade fever,runny nose           HPI presents office complaining of cough congestion productive yellow-greenish phlegm subjective fevers      Past Medical History, Past Surgical History, Family history, Social History, and Medications were all reviewed with the patient today and updated as necessary. Current Outpatient Medications   Medication Sig Dispense Refill    levoFLOXacin (LEVAQUIN) 750 MG tablet Take 1 tablet by mouth daily for 7 days 7 tablet 0    albuterol sulfate HFA (PROVENTIL HFA) 108 (90 Base) MCG/ACT inhaler Inhale 2 puffs into the lungs every 6 hours as needed for Wheezing or Shortness of Breath 1 each 11    Dextromethorphan-guaiFENesin (MUCINEX DM MAXIMUM STRENGTH)  MG TB12 Take 1 tablet by mouth every 12 hours for 10 days 20 tablet 0    HYDROcodone homatropine (HYCODAN) 5-1.5 MG/5ML solution Take 5 mLs by mouth every 6 hours as needed (as needed for Cough) for up to 7 days. Max Daily Amount: 20 mLs 140 mL 0    atorvastatin (LIPITOR) 20 MG tablet TAKE ONE TABLET BY MOUTH ONE TIME DAILY (Patient not taking: Reported on 1/27/2023)      Cholecalciferol 50 MCG (2000 UT) TABS Take by mouth (Patient not taking: Reported on 1/27/2023)       No current facility-administered medications for this visit.      Allergies   Allergen Reactions    Penicillins Hives    Amoxicillin Hives     Swelling, redness     Patient Active Problem List   Diagnosis    Hypertension    Vitamin D deficiency    Hypercholesterolemia     Past Medical History:   Diagnosis Date    Hypercholesterolemia     controlled with medication     Vitamin D deficiency 03/24/2013    [facility] (Level 19)     Past Surgical History:   Procedure Laterality Date    COLONOSCOPY N/A 5/20/2020    COLONOSCOPY/ 27 performed by Roslyn Vallejo MD at [address] ([place] [address]) FLX DX W/COLLJ SPEC WHEN PFRMD  5/20/2020         HEENT  2012    Sinus Surgery    KNEE SURGERY Left 1994     Family History   Problem Relation Age of Onset    No Known Problems Mother     Heart Attack Father     Coronary Art Dis Father     Cancer Father         Skin     Social History     Tobacco Use    Smoking status: Never     Passive exposure: Never    Smokeless tobacco: Former   Substance Use Topics    Alcohol use: Yes         Review of Systems   Constitutional:  Negative for chills, fatigue and fever. HENT:  Negative for congestion, rhinorrhea and sinus pain. Respiratory:  Positive for cough, chest tightness, shortness of breath and wheezing. Cardiovascular:  Negative for chest pain. Gastrointestinal:  Negative for nausea and vomiting. Skin:  Negative for rash. OBJECTIVE:  /88 (Site: Left Upper Arm, Position: Sitting, Cuff Size: Small Adult)   Ht 5' 8\" (1.727 m)   Wt 175 lb (79.4 kg)   BMI 26.61 kg/m²      Physical Exam  Nursing note reviewed. Constitutional:       General: He is not in acute distress. Appearance: Normal appearance. HENT:      Head: Normocephalic and atraumatic. Right Ear: Tympanic membrane normal.      Left Ear: Tympanic membrane normal.      Nose: Nose normal.      Mouth/Throat:      Mouth: Mucous membranes are moist.   Cardiovascular:      Rate and Rhythm: Normal rate and regular rhythm. Heart sounds: Normal heart sounds. Pulmonary:      Effort: Pulmonary effort is normal. No accessory muscle usage or respiratory distress. Breath sounds: No decreased air movement. Examination of the right-upper field reveals wheezing. Examination of the left-upper field reveals wheezing. Examination of the right-middle field reveals wheezing. Examination of the left-middle field reveals wheezing. Wheezing present.    Musculoskeletal:      Cervical back: Normal range of motion and neck supple. Neurological:      Mental Status: He is alert. Medical problems and test results were reviewed with the patient today. No results found for this or any previous visit (from the past 672 hour(s)). ASSESSMENT and PLAN    Visit Diagnoses and Associated Orders       Acute bronchitis, unspecified organism    -  Primary    albuterol (PROVENTIL) nebulizer solution 2.5 mg [250]      levoFLOXacin (LEVAQUIN) 750 MG tablet [02753]      albuterol sulfate HFA (PROVENTIL HFA) 108 (90 Base) MCG/ACT inhaler [17684]      Dextromethorphan-guaiFENesin (MUCINEX DM MAXIMUM STRENGTH)  MG TB12 [57374]      HYDROcodone homatropine (HYCODAN) 5-1.5 MG/5ML solution [927245]           Wheezing        albuterol (PROVENTIL) nebulizer solution 2.5 mg [250]      albuterol sulfate HFA (PROVENTIL HFA) 108 (90 Base) MCG/ACT inhaler [34809]           Routine general medical examination at a health care facility        PSA Screening [ Custom]   - Future Order    AMB POC URINALYSIS DIP STICK MANUAL W/O MICRO [31651 CPT(R)]   - Future Order    Hepatitis C Antibody [22604 Custom]   - Future Order    Vitamin D 25 Hydroxy [29164 Custom]   - Future Order    Lipid Panel [41148 Custom]   - Future Order    Comprehensive Metabolic Panel [99530 Custom]   - Future Order    HIV 1/2 Ag/Ab, 4TH Generation,W Rflx Confirm [93847 CPT(R)]   - Future Order    CBC with Auto Differential [[id number] Custom]   - Future Order    TSH with Reflex [[id number] Custom]   - Future Order                     Diagnosis Orders   1. Acute bronchitis, unspecified organism  albuterol (PROVENTIL) nebulizer solution 2.5 mg    levoFLOXacin (LEVAQUIN) 750 MG tablet    albuterol sulfate HFA (PROVENTIL HFA) 108 (90 Base) MCG/ACT inhaler    Dextromethorphan-guaiFENesin (MUCINEX DM MAXIMUM STRENGTH)  MG TB12    HYDROcodone homatropine (HYCODAN) 5-1.5 MG/5ML solution      2.  Wheezing  albuterol (PROVENTIL) nebulizer solution 2.5 mg albuterol sulfate HFA (PROVENTIL HFA) 108 (90 Base) MCG/ACT inhaler      3. Routine general medical examination at a health care facility  PSA Screening    AMB POC URINALYSIS DIP STICK MANUAL W/O MICRO    Hepatitis C Antibody    Vitamin D 25 Hydroxy    Lipid Panel    Comprehensive Metabolic Panel    HIV 1/2 Ag/Ab, 4TH Generation,W Rflx Confirm    CBC with Auto Differential    TSH with Reflex      , Concepcion Brock was seen today for chest congestion. Diagnoses and all orders for this visit:    Acute bronchitis, unspecified organism  -     albuterol (PROVENTIL) nebulizer solution 2.5 mg  -     levoFLOXacin (LEVAQUIN) 750 MG tablet; Take 1 tablet by mouth daily for 7 days  -     albuterol sulfate HFA (PROVENTIL HFA) 108 (90 Base) MCG/ACT inhaler; Inhale 2 puffs into the lungs every 6 hours as needed for Wheezing or Shortness of Breath  -     Dextromethorphan-guaiFENesin (MUCINEX DM MAXIMUM STRENGTH)  MG TB12; Take 1 tablet by mouth every 12 hours for 10 days  -     HYDROcodone homatropine (HYCODAN) 5-1.5 MG/5ML solution; Take 5 mLs by mouth every 6 hours as needed (as needed for Cough) for up to 7 days. Max Daily Amount: 20 mLs    Wheezing  -     albuterol (PROVENTIL) nebulizer solution 2.5 mg  -     albuterol sulfate HFA (PROVENTIL HFA) 108 (90 Base) MCG/ACT inhaler; Inhale 2 puffs into the lungs every 6 hours as needed for Wheezing or Shortness of Breath    Routine general medical examination at a health care facility  -     PSA Screening; Future  -     AMB POC URINALYSIS DIP STICK MANUAL W/O MICRO; Future  -     Hepatitis C Antibody; Future  -     Vitamin D 25 Hydroxy; Future  -     Lipid Panel; Future  -     Comprehensive Metabolic Panel; Future  -     HIV 1/2 Ag/Ab, 4TH Generation,W Rflx Confirm; Future  -     CBC with Auto Differential; Future  -     TSH with Reflex;  Future  , After breathing treatment had improvement of breath sounds we will continue with albuterol MDI 2 puffs 4 times a day we will start him on Levaquin Hycodan at bedtime Mucinex during the day supportive care follow-up as needed and we will set him up for physical in the near future

## 2023-04-03 ENCOUNTER — NURSE ONLY (OUTPATIENT)
Dept: FAMILY MEDICINE CLINIC | Facility: CLINIC | Age: 58
End: 2023-04-03
Payer: COMMERCIAL

## 2023-04-03 DIAGNOSIS — Z00.00 ROUTINE GENERAL MEDICAL EXAMINATION AT A HEALTH CARE FACILITY: ICD-10-CM

## 2023-04-03 LAB
BASOPHILS # BLD: 0 K/UL (ref 0–0.2)
BASOPHILS NFR BLD: 1 % (ref 0–2)
BILIRUBIN, URINE, POC: NEGATIVE
BLOOD URINE, POC: NEGATIVE
DIFFERENTIAL METHOD BLD: NORMAL
EOSINOPHIL # BLD: 0.2 K/UL (ref 0–0.8)
EOSINOPHIL NFR BLD: 4 % (ref 0.5–7.8)
ERYTHROCYTE [DISTWIDTH] IN BLOOD BY AUTOMATED COUNT: 12.5 % (ref 11.9–14.6)
GLUCOSE URINE, POC: NEGATIVE
HCT VFR BLD AUTO: 45.6 % (ref 41.1–50.3)
HGB BLD-MCNC: 15.1 G/DL (ref 13.6–17.2)
HIV 1+2 AB+HIV1 P24 AG SERPL QL IA: NONREACTIVE
HIV 1/2 RESULT COMMENT: NORMAL
IMM GRANULOCYTES # BLD AUTO: 0 K/UL (ref 0–0.5)
IMM GRANULOCYTES NFR BLD AUTO: 0 % (ref 0–5)
KETONES, URINE, POC: NEGATIVE
LEUKOCYTE ESTERASE, URINE, POC: NEGATIVE
LYMPHOCYTES # BLD: 2 K/UL (ref 0.5–4.6)
LYMPHOCYTES NFR BLD: 37 % (ref 13–44)
MCH RBC QN AUTO: 28.9 PG (ref 26.1–32.9)
MCHC RBC AUTO-ENTMCNC: 33.1 G/DL (ref 31.4–35)
MCV RBC AUTO: 87.2 FL (ref 82–102)
MONOCYTES # BLD: 0.4 K/UL (ref 0.1–1.3)
MONOCYTES NFR BLD: 8 % (ref 4–12)
NEUTS SEG # BLD: 2.7 K/UL (ref 1.7–8.2)
NEUTS SEG NFR BLD: 50 % (ref 43–78)
NITRITE, URINE, POC: NEGATIVE
NRBC # BLD: 0 K/UL (ref 0–0.2)
PH, URINE, POC: 5.5 (ref 4.6–8)
PLATELET # BLD AUTO: 234 K/UL (ref 150–450)
PMV BLD AUTO: 9.7 FL (ref 9.4–12.3)
PROTEIN,URINE, POC: NEGATIVE
RBC # BLD AUTO: 5.23 M/UL (ref 4.23–5.6)
SPECIFIC GRAVITY, URINE, POC: 1 (ref 1–1.03)
URINALYSIS CLARITY, POC: CLEAR
URINALYSIS COLOR, POC: YELLOW
UROBILINOGEN, POC: NORMAL
WBC # BLD AUTO: 5.4 K/UL (ref 4.3–11.1)

## 2023-04-03 PROCEDURE — 81002 URINALYSIS NONAUTO W/O SCOPE: CPT | Performed by: FAMILY MEDICINE

## 2023-04-04 LAB
25(OH)D3 SERPL-MCNC: 48.4 NG/ML (ref 30–100)
ALBUMIN SERPL-MCNC: 4.2 G/DL (ref 3.5–5)
ALBUMIN/GLOB SERPL: 1.3 (ref 0.4–1.6)
ALP SERPL-CCNC: 80 U/L (ref 50–136)
ALT SERPL-CCNC: 19 U/L (ref 12–65)
ANION GAP SERPL CALC-SCNC: 2 MMOL/L (ref 2–11)
AST SERPL-CCNC: 19 U/L (ref 15–37)
BILIRUB SERPL-MCNC: 0.6 MG/DL (ref 0.2–1.1)
BUN SERPL-MCNC: 19 MG/DL (ref 6–23)
CALCIUM SERPL-MCNC: 9 MG/DL (ref 8.3–10.4)
CHLORIDE SERPL-SCNC: 107 MMOL/L (ref 101–110)
CHOLEST SERPL-MCNC: 224 MG/DL
CO2 SERPL-SCNC: 26 MMOL/L (ref 21–32)
CREAT SERPL-MCNC: 1.2 MG/DL (ref 0.8–1.5)
GLOBULIN SER CALC-MCNC: 3.2 G/DL (ref 2.8–4.5)
GLUCOSE SERPL-MCNC: 94 MG/DL (ref 65–100)
HCV AB SER QL: NONREACTIVE
HDLC SERPL-MCNC: 50 MG/DL (ref 40–60)
HDLC SERPL: 4.5
LDLC SERPL CALC-MCNC: 154 MG/DL
POTASSIUM SERPL-SCNC: 4.5 MMOL/L (ref 3.5–5.1)
PROT SERPL-MCNC: 7.4 G/DL (ref 6.3–8.2)
PSA SERPL-MCNC: 0.7 NG/ML
SODIUM SERPL-SCNC: 135 MMOL/L (ref 133–143)
TRIGL SERPL-MCNC: 100 MG/DL (ref 35–150)
TSH W FREE THYROID IF ABNORMAL: 2.8 UIU/ML (ref 0.36–3.74)
VLDLC SERPL CALC-MCNC: 20 MG/DL (ref 6–23)

## 2023-06-01 ENCOUNTER — NURSE ONLY (OUTPATIENT)
Dept: FAMILY MEDICINE CLINIC | Facility: CLINIC | Age: 58
End: 2023-06-01

## 2023-06-01 DIAGNOSIS — E78.00 HYPERCHOLESTEROLEMIA: ICD-10-CM

## 2023-06-02 LAB
ALBUMIN SERPL-MCNC: 4.1 G/DL (ref 3.5–5)
ALBUMIN/GLOB SERPL: 1.2 (ref 0.4–1.6)
ALP SERPL-CCNC: 89 U/L (ref 50–136)
ALT SERPL-CCNC: 21 U/L (ref 12–65)
AST SERPL-CCNC: 20 U/L (ref 15–37)
BILIRUB DIRECT SERPL-MCNC: 0.2 MG/DL
BILIRUB SERPL-MCNC: 0.6 MG/DL (ref 0.2–1.1)
CHOLEST SERPL-MCNC: 137 MG/DL
GLOBULIN SER CALC-MCNC: 3.4 G/DL (ref 2.8–4.5)
HDLC SERPL-MCNC: 51 MG/DL (ref 40–60)
HDLC SERPL: 2.7
LDLC SERPL CALC-MCNC: 61 MG/DL
PROT SERPL-MCNC: 7.5 G/DL (ref 6.3–8.2)
TRIGL SERPL-MCNC: 125 MG/DL (ref 35–150)
VLDLC SERPL CALC-MCNC: 25 MG/DL (ref 6–23)

## 2024-01-02 ENCOUNTER — OFFICE VISIT (OUTPATIENT)
Dept: FAMILY MEDICINE CLINIC | Facility: CLINIC | Age: 59
End: 2024-01-02
Payer: COMMERCIAL

## 2024-01-02 VITALS
DIASTOLIC BLOOD PRESSURE: 80 MMHG | WEIGHT: 176 LBS | SYSTOLIC BLOOD PRESSURE: 120 MMHG | BODY MASS INDEX: 26.67 KG/M2 | HEIGHT: 68 IN

## 2024-01-02 DIAGNOSIS — J01.10 ACUTE NON-RECURRENT FRONTAL SINUSITIS: ICD-10-CM

## 2024-01-02 DIAGNOSIS — J20.9 ACUTE BRONCHITIS, UNSPECIFIED ORGANISM: Primary | ICD-10-CM

## 2024-01-02 PROCEDURE — 3079F DIAST BP 80-89 MM HG: CPT | Performed by: FAMILY MEDICINE

## 2024-01-02 PROCEDURE — 3074F SYST BP LT 130 MM HG: CPT | Performed by: FAMILY MEDICINE

## 2024-01-02 PROCEDURE — 99213 OFFICE O/P EST LOW 20 MIN: CPT | Performed by: FAMILY MEDICINE

## 2024-01-02 RX ORDER — LEVOFLOXACIN 500 MG/1
500 TABLET, FILM COATED ORAL DAILY
Qty: 10 TABLET | Refills: 0 | Status: SHIPPED | OUTPATIENT
Start: 2024-01-02 | End: 2024-01-12

## 2024-01-02 RX ORDER — FLUTICASONE PROPIONATE 50 MCG
2 SPRAY, SUSPENSION (ML) NASAL DAILY
Qty: 1 EACH | Refills: 11 | Status: SHIPPED | OUTPATIENT
Start: 2024-01-02

## 2024-01-02 RX ORDER — ALBUTEROL SULFATE 90 UG/1
2 AEROSOL, METERED RESPIRATORY (INHALATION) EVERY 6 HOURS PRN
Qty: 1 EACH | Refills: 11 | Status: SHIPPED | OUTPATIENT
Start: 2024-01-02

## 2024-01-02 RX ORDER — GUAIFENESIN AND DEXTROMETHORPHAN HYDROBROMIDE 1200; 60 MG/1; MG/1
1 TABLET, EXTENDED RELEASE ORAL EVERY 12 HOURS
Qty: 60 TABLET | Refills: 0 | Status: SHIPPED | OUTPATIENT
Start: 2024-01-02

## 2024-01-02 ASSESSMENT — ENCOUNTER SYMPTOMS
VOMITING: 0
NAUSEA: 0
COUGH: 1
CHEST TIGHTNESS: 1
RHINORRHEA: 0
WHEEZING: 1
SINUS PAIN: 0
SHORTNESS OF BREATH: 1

## 2024-01-02 NOTE — PROGRESS NOTES
puffs into the lungs every 6 hours as needed for Wheezing or Shortness of Breath  -     Dextromethorphan-guaiFENesin (MUCINEX DM MAXIMUM STRENGTH)  MG TB12; Take 1 tablet by mouth in the morning and 1 tablet in the evening.  -     fluticasone (FLONASE) 50 MCG/ACT nasal spray; 2 sprays by Each Nostril route daily    Acute non-recurrent frontal sinusitis  -     levoFLOXacin (LEVAQUIN) 500 MG tablet; Take 1 tablet by mouth daily for 10 days  -     albuterol sulfate HFA (PROVENTIL HFA) 108 (90 Base) MCG/ACT inhaler; Inhale 2 puffs into the lungs every 6 hours as needed for Wheezing or Shortness of Breath  -     Dextromethorphan-guaiFENesin (MUCINEX DM MAXIMUM STRENGTH)  MG TB12; Take 1 tablet by mouth in the morning and 1 tablet in the evening.  -     fluticasone (FLONASE) 50 MCG/ACT nasal spray; 2 sprays by Each Nostril route daily    , We will start him on Levaquin albuterol Mucinex DM supportive care follow-up as needed

## 2024-04-17 ENCOUNTER — NURSE ONLY (OUTPATIENT)
Dept: FAMILY MEDICINE CLINIC | Facility: CLINIC | Age: 59
End: 2024-04-17
Payer: COMMERCIAL

## 2024-04-17 DIAGNOSIS — Z00.00 LABORATORY EXAM ORDERED AS PART OF ROUTINE GENERAL MEDICAL EXAMINATION: Primary | ICD-10-CM

## 2024-04-17 DIAGNOSIS — Z12.5 SPECIAL SCREENING FOR MALIGNANT NEOPLASM OF PROSTATE: ICD-10-CM

## 2024-04-17 DIAGNOSIS — E55.9 VITAMIN D DEFICIENCY: ICD-10-CM

## 2024-04-17 LAB
25(OH)D3 SERPL-MCNC: 46.2 NG/ML (ref 30–100)
ALBUMIN SERPL-MCNC: 4 G/DL (ref 3.5–5)
ALBUMIN/GLOB SERPL: 1.3 (ref 0.4–1.6)
ALP SERPL-CCNC: 84 U/L (ref 50–136)
ALT SERPL-CCNC: 18 U/L (ref 12–65)
ANION GAP SERPL CALC-SCNC: 3 MMOL/L (ref 2–11)
AST SERPL-CCNC: 19 U/L (ref 15–37)
BILIRUB SERPL-MCNC: 0.7 MG/DL (ref 0.2–1.1)
BILIRUBIN, URINE, POC: NEGATIVE
BLOOD URINE, POC: NEGATIVE
BUN SERPL-MCNC: 24 MG/DL (ref 6–23)
CALCIUM SERPL-MCNC: 9.1 MG/DL (ref 8.3–10.4)
CHLORIDE SERPL-SCNC: 109 MMOL/L (ref 103–113)
CHOLEST SERPL-MCNC: 138 MG/DL
CO2 SERPL-SCNC: 27 MMOL/L (ref 21–32)
CREAT SERPL-MCNC: 1.3 MG/DL (ref 0.8–1.5)
GLOBULIN SER CALC-MCNC: 3.1 G/DL (ref 2.8–4.5)
GLUCOSE SERPL-MCNC: 109 MG/DL (ref 65–100)
GLUCOSE URINE, POC: NEGATIVE
GRANS ABSOLUTE, POC: 2.8 K/UL
GRANULOCYTES %, POC: 49.7 %
HDLC SERPL-MCNC: 48 MG/DL (ref 40–60)
HDLC SERPL: 2.9
HEMATOCRIT, POC: 44.1 %
HEMOGLOBIN, POC: 14.4 G/DL
KETONES, URINE, POC: NEGATIVE
LDLC SERPL CALC-MCNC: 78 MG/DL
LEUKOCYTE ESTERASE, URINE, POC: NEGATIVE
LYMPHOCYTE %, POC: 41 %
LYMPHS ABSOLUTE, POC: 2.3 K/UL
MCH, POC: 29.3 PG (ref 20–?)
MCHC, POC: 32.7
MCV, POC: 89.7
MONOCYTE %, POC: 9.3 %
MONOCYTE, ABSOLUTE POC: 0.5 K/UL
MPV, POC: 8 FL
NITRITE, URINE, POC: NEGATIVE
PH, URINE, POC: 6 (ref 4.6–8)
PLATELET COUNT, POC: 238 K/UL
POTASSIUM SERPL-SCNC: 4.8 MMOL/L (ref 3.5–5.1)
PROT SERPL-MCNC: 7.1 G/DL (ref 6.3–8.2)
PROTEIN,URINE, POC: NEGATIVE
PSA SERPL-MCNC: 0.7 NG/ML
RBC, POC: 4.92 M/UL
RDW, POC: 13.2 %
SODIUM SERPL-SCNC: 139 MMOL/L (ref 136–146)
SPECIFIC GRAVITY, URINE, POC: 1.03 (ref 1–1.03)
TRIGL SERPL-MCNC: 60 MG/DL (ref 35–150)
TSH, 3RD GENERATION: 2.94 UIU/ML (ref 0.36–3.74)
URINALYSIS CLARITY, POC: CLEAR
URINALYSIS COLOR, POC: YELLOW
UROBILINOGEN, POC: NORMAL
VLDLC SERPL CALC-MCNC: 12 MG/DL (ref 6–23)
WBC, POC: 5.6 K/UL

## 2024-04-17 PROCEDURE — 36415 COLL VENOUS BLD VENIPUNCTURE: CPT | Performed by: FAMILY MEDICINE

## 2024-04-17 PROCEDURE — 81003 URINALYSIS AUTO W/O SCOPE: CPT | Performed by: FAMILY MEDICINE

## 2024-04-17 PROCEDURE — 85025 COMPLETE CBC W/AUTO DIFF WBC: CPT | Performed by: FAMILY MEDICINE

## 2024-04-26 ENCOUNTER — OFFICE VISIT (OUTPATIENT)
Dept: FAMILY MEDICINE CLINIC | Facility: CLINIC | Age: 59
End: 2024-04-26
Payer: COMMERCIAL

## 2024-04-26 VITALS
BODY MASS INDEX: 26.52 KG/M2 | HEART RATE: 68 BPM | DIASTOLIC BLOOD PRESSURE: 80 MMHG | SYSTOLIC BLOOD PRESSURE: 116 MMHG | WEIGHT: 175 LBS | HEIGHT: 68 IN

## 2024-04-26 DIAGNOSIS — J01.10 ACUTE NON-RECURRENT FRONTAL SINUSITIS: ICD-10-CM

## 2024-04-26 DIAGNOSIS — J20.9 ACUTE BRONCHITIS, UNSPECIFIED ORGANISM: ICD-10-CM

## 2024-04-26 DIAGNOSIS — R06.2 WHEEZING: ICD-10-CM

## 2024-04-26 DIAGNOSIS — Z00.00 ROUTINE GENERAL MEDICAL EXAMINATION AT A HEALTH CARE FACILITY: Primary | ICD-10-CM

## 2024-04-26 DIAGNOSIS — E78.00 HYPERCHOLESTEROLEMIA: ICD-10-CM

## 2024-04-26 DIAGNOSIS — Z13.31 SCREENING FOR DEPRESSION: ICD-10-CM

## 2024-04-26 PROCEDURE — 99396 PREV VISIT EST AGE 40-64: CPT | Performed by: FAMILY MEDICINE

## 2024-04-26 PROCEDURE — 3079F DIAST BP 80-89 MM HG: CPT | Performed by: FAMILY MEDICINE

## 2024-04-26 PROCEDURE — 3074F SYST BP LT 130 MM HG: CPT | Performed by: FAMILY MEDICINE

## 2024-04-26 RX ORDER — ALBUTEROL SULFATE 90 UG/1
2 AEROSOL, METERED RESPIRATORY (INHALATION) EVERY 6 HOURS PRN
Qty: 1 EACH | Refills: 11 | Status: SHIPPED | OUTPATIENT
Start: 2024-04-26

## 2024-04-26 RX ORDER — FLUTICASONE PROPIONATE 50 MCG
2 SPRAY, SUSPENSION (ML) NASAL DAILY
Qty: 1 EACH | Refills: 11 | Status: SHIPPED | OUTPATIENT
Start: 2024-04-26

## 2024-04-26 RX ORDER — ATORVASTATIN CALCIUM 20 MG/1
20 TABLET, FILM COATED ORAL DAILY
Qty: 90 TABLET | Refills: 3 | Status: SHIPPED | OUTPATIENT
Start: 2024-04-26

## 2024-04-26 SDOH — ECONOMIC STABILITY: FOOD INSECURITY: WITHIN THE PAST 12 MONTHS, YOU WORRIED THAT YOUR FOOD WOULD RUN OUT BEFORE YOU GOT MONEY TO BUY MORE.: NEVER TRUE

## 2024-04-26 SDOH — ECONOMIC STABILITY: FOOD INSECURITY: WITHIN THE PAST 12 MONTHS, THE FOOD YOU BOUGHT JUST DIDN'T LAST AND YOU DIDN'T HAVE MONEY TO GET MORE.: NEVER TRUE

## 2024-04-26 SDOH — ECONOMIC STABILITY: INCOME INSECURITY: HOW HARD IS IT FOR YOU TO PAY FOR THE VERY BASICS LIKE FOOD, HOUSING, MEDICAL CARE, AND HEATING?: NOT HARD AT ALL

## 2024-04-26 SDOH — ECONOMIC STABILITY: HOUSING INSECURITY
IN THE LAST 12 MONTHS, WAS THERE A TIME WHEN YOU DID NOT HAVE A STEADY PLACE TO SLEEP OR SLEPT IN A SHELTER (INCLUDING NOW)?: NO

## 2024-04-26 ASSESSMENT — ENCOUNTER SYMPTOMS
CONSTIPATION: 0
ABDOMINAL PAIN: 0
SHORTNESS OF BREATH: 0
DIARRHEA: 0
VOMITING: 0
COUGH: 0
NAUSEA: 0
SORE THROAT: 0

## 2024-04-26 ASSESSMENT — PATIENT HEALTH QUESTIONNAIRE - PHQ9
SUM OF ALL RESPONSES TO PHQ9 QUESTIONS 1 & 2: 0
SUM OF ALL RESPONSES TO PHQ QUESTIONS 1-9: 0
1. LITTLE INTEREST OR PLEASURE IN DOING THINGS: NOT AT ALL
SUM OF ALL RESPONSES TO PHQ QUESTIONS 1-9: 0
2. FEELING DOWN, DEPRESSED OR HOPELESS: NOT AT ALL

## 2024-04-26 NOTE — PROGRESS NOTES
2.8 - 4.5 g/dL    Albumin/Globulin Ratio 1.3 0.4 - 1.6     AMB POC URINALYSIS DIP STICK AUTO W/O MICRO    Collection Time: 04/17/24 10:00 AM   Result Value Ref Range    Color, Urine, POC Yellow     Clarity, Urine, POC Clear     Glucose, Urine, POC Negative     Bilirubin, Urine, POC Negative     Ketones, Urine, POC Negative     Specific Gravity, Urine, POC 1.030 1.001 - 1.035    Blood, Urine, POC Negative     pH, Urine, POC 6.0 4.6 - 8.0    Protein, Urine, POC Negative     Urobilinogen, POC Normal     Nitrite, Urine, POC Negative     Leukocyte Esterase, Urine, POC Negative    AMB POC KTY COMPLETE CBC    Collection Time: 04/17/24 10:00 AM   Result Value Ref Range    WBC, POC 5.6 K/uL    Lymphocyte % 41.0 %    Monocyte % 9.3 %    Granulocytes %, POC 49.7 %    Lymphs Abs 2.3 K/uL    Monocyte Absolute, POC 0.5 K/uL    Granulocytes Abs 2.8 K/uL    RBC, POC 4.92 M/uL    Hemoglobin, POC 14.4 G/DL    Hematocrit, POC 44.1 %    MCV 89.7     MCH 29.3 20 pg    MCHC 32.7     RDW, POC 13.2 %    Platelet Count,  K/UL    MPV POC 8.0 fL       ASSESSMENT and PLAN    Visit Diagnoses and Associated Orders       Routine general medical examination at a health care facility    -  Primary         Acute bronchitis, unspecified organism        albuterol sulfate HFA (PROVENTIL HFA) 108 (90 Base) MCG/ACT inhaler [24952]      fluticasone (FLONASE) 50 MCG/ACT nasal spray [14676]           Wheezing        albuterol sulfate HFA (PROVENTIL HFA) 108 (90 Base) MCG/ACT inhaler [45334]           Acute non-recurrent frontal sinusitis        fluticasone (FLONASE) 50 MCG/ACT nasal spray [65884]           Hypercholesterolemia        atorvastatin (LIPITOR) 20 MG tablet [70867]           Screening for depression                         Diagnosis Orders   1. Routine general medical examination at a health care facility        2. Acute bronchitis, unspecified organism  albuterol sulfate HFA (PROVENTIL HFA) 108 (90 Base) MCG/ACT inhaler    fluticasone

## 2025-04-25 DIAGNOSIS — Z12.5 SPECIAL SCREENING FOR MALIGNANT NEOPLASM OF PROSTATE: ICD-10-CM

## 2025-04-25 DIAGNOSIS — E78.00 HYPERCHOLESTEROLEMIA: ICD-10-CM

## 2025-04-25 DIAGNOSIS — Z00.00 LABORATORY EXAM ORDERED AS PART OF ROUTINE GENERAL MEDICAL EXAMINATION: Primary | ICD-10-CM

## 2025-04-25 DIAGNOSIS — E55.9 VITAMIN D DEFICIENCY: ICD-10-CM

## 2025-04-30 ENCOUNTER — LAB (OUTPATIENT)
Dept: FAMILY MEDICINE CLINIC | Facility: CLINIC | Age: 60
End: 2025-04-30

## 2025-04-30 DIAGNOSIS — Z00.00 LABORATORY EXAM ORDERED AS PART OF ROUTINE GENERAL MEDICAL EXAMINATION: ICD-10-CM

## 2025-04-30 DIAGNOSIS — E78.00 HYPERCHOLESTEROLEMIA: ICD-10-CM

## 2025-04-30 DIAGNOSIS — E55.9 VITAMIN D DEFICIENCY: ICD-10-CM

## 2025-04-30 DIAGNOSIS — Z12.5 SPECIAL SCREENING FOR MALIGNANT NEOPLASM OF PROSTATE: ICD-10-CM

## 2025-04-30 LAB
25(OH)D3 SERPL-MCNC: 22.7 NG/ML (ref 30–100)
ALBUMIN SERPL-MCNC: 3.9 G/DL (ref 3.5–5)
ALBUMIN/GLOB SERPL: 1.3 (ref 1–1.9)
ALP SERPL-CCNC: 89 U/L (ref 40–129)
ALT SERPL-CCNC: 16 U/L (ref 8–55)
ANION GAP SERPL CALC-SCNC: 10 MMOL/L (ref 7–16)
AST SERPL-CCNC: 23 U/L (ref 15–37)
BILIRUB SERPL-MCNC: 0.5 MG/DL (ref 0–1.2)
BILIRUBIN, URINE, POC: NEGATIVE
BLOOD URINE, POC: NEGATIVE
BUN SERPL-MCNC: 19 MG/DL (ref 6–23)
CALCIUM SERPL-MCNC: 8.9 MG/DL (ref 8.8–10.2)
CHLORIDE SERPL-SCNC: 104 MMOL/L (ref 98–107)
CHOLEST SERPL-MCNC: 146 MG/DL (ref 0–200)
CO2 SERPL-SCNC: 24 MMOL/L (ref 20–29)
CREAT SERPL-MCNC: 1.12 MG/DL (ref 0.8–1.3)
GLOBULIN SER CALC-MCNC: 3 G/DL (ref 2.3–3.5)
GLUCOSE SERPL-MCNC: 97 MG/DL (ref 70–99)
GLUCOSE URINE, POC: NEGATIVE
GRANS ABSOLUTE, POC: 3.1 K/UL
GRANULOCYTES %, POC: 45.8 %
HDLC SERPL-MCNC: 45 MG/DL (ref 40–60)
HDLC SERPL: 3.2 (ref 0–5)
HEMATOCRIT, POC: 43 %
HEMOGLOBIN, POC: 14.3 G/DL
KETONES, URINE, POC: NEGATIVE
LDLC SERPL CALC-MCNC: 81 MG/DL (ref 0–100)
LEUKOCYTE ESTERASE, URINE, POC: NEGATIVE
LYMPHOCYTE %, POC: 44.6 %
LYMPHS ABSOLUTE, POC: 3 K/UL
MCH, POC: 29 PG (ref 20–?)
MCHC, POC: 33.3
MCV, POC: 87.3
MONOCYTE %, POC: 9.6 %
MONOCYTE, ABSOLUTE POC: 0.7 K/UL
MPV, POC: 6.9 FL
NITRITE, URINE, POC: NEGATIVE
PH, URINE, POC: 5.5 (ref 4.6–8)
PLATELET COUNT, POC: 278 K/UL
POTASSIUM SERPL-SCNC: 4.9 MMOL/L (ref 3.5–5.1)
PROT SERPL-MCNC: 6.9 G/DL (ref 6.3–8.2)
PROTEIN,URINE, POC: NEGATIVE
PSA SERPL-MCNC: 0.7 NG/ML (ref 0–4)
RBC, POC: 4.93 M/UL
RDW, POC: 12.2 %
SODIUM SERPL-SCNC: 139 MMOL/L (ref 136–145)
SPECIFIC GRAVITY, URINE, POC: 1 (ref 1–1.03)
TRIGL SERPL-MCNC: 100 MG/DL (ref 0–150)
TSH, 3RD GENERATION: 4.39 UIU/ML (ref 0.27–4.2)
URINALYSIS CLARITY, POC: CLEAR
URINALYSIS COLOR, POC: YELLOW
UROBILINOGEN, POC: NORMAL
VLDLC SERPL CALC-MCNC: 20 MG/DL (ref 6–23)
WBC, POC: 6.8 K/UL

## 2025-05-16 ENCOUNTER — OFFICE VISIT (OUTPATIENT)
Dept: FAMILY MEDICINE CLINIC | Facility: CLINIC | Age: 60
End: 2025-05-16
Payer: COMMERCIAL

## 2025-05-16 VITALS
DIASTOLIC BLOOD PRESSURE: 80 MMHG | SYSTOLIC BLOOD PRESSURE: 136 MMHG | BODY MASS INDEX: 26.98 KG/M2 | HEIGHT: 68 IN | WEIGHT: 178 LBS | HEART RATE: 76 BPM

## 2025-05-16 DIAGNOSIS — J30.89 ENVIRONMENTAL AND SEASONAL ALLERGIES: ICD-10-CM

## 2025-05-16 DIAGNOSIS — R79.89 ELEVATED TSH: ICD-10-CM

## 2025-05-16 DIAGNOSIS — J45.21 MILD INTERMITTENT REACTIVE AIRWAY DISEASE WITH ACUTE EXACERBATION: ICD-10-CM

## 2025-05-16 DIAGNOSIS — E78.00 HYPERCHOLESTEROLEMIA: ICD-10-CM

## 2025-05-16 DIAGNOSIS — R06.2 WHEEZING: ICD-10-CM

## 2025-05-16 DIAGNOSIS — Z00.00 ROUTINE GENERAL MEDICAL EXAMINATION AT A HEALTH CARE FACILITY: Primary | ICD-10-CM

## 2025-05-16 DIAGNOSIS — Z13.31 SCREENING FOR DEPRESSION: ICD-10-CM

## 2025-05-16 PROCEDURE — 99213 OFFICE O/P EST LOW 20 MIN: CPT | Performed by: FAMILY MEDICINE

## 2025-05-16 PROCEDURE — 3075F SYST BP GE 130 - 139MM HG: CPT | Performed by: FAMILY MEDICINE

## 2025-05-16 PROCEDURE — 99396 PREV VISIT EST AGE 40-64: CPT | Performed by: FAMILY MEDICINE

## 2025-05-16 PROCEDURE — 3079F DIAST BP 80-89 MM HG: CPT | Performed by: FAMILY MEDICINE

## 2025-05-16 RX ORDER — FLUTICASONE PROPIONATE 50 MCG
2 SPRAY, SUSPENSION (ML) NASAL DAILY
Qty: 3 EACH | Refills: 3 | Status: SHIPPED | OUTPATIENT
Start: 2025-05-16

## 2025-05-16 RX ORDER — ATORVASTATIN CALCIUM 20 MG/1
20 TABLET, FILM COATED ORAL DAILY
Qty: 90 TABLET | Refills: 3 | Status: SHIPPED | OUTPATIENT
Start: 2025-05-16

## 2025-05-16 RX ORDER — ALBUTEROL SULFATE 90 UG/1
2 INHALANT RESPIRATORY (INHALATION) EVERY 6 HOURS PRN
Qty: 3 EACH | Refills: 5 | Status: SHIPPED | OUTPATIENT
Start: 2025-05-16

## 2025-05-16 SDOH — ECONOMIC STABILITY: FOOD INSECURITY: WITHIN THE PAST 12 MONTHS, THE FOOD YOU BOUGHT JUST DIDN'T LAST AND YOU DIDN'T HAVE MONEY TO GET MORE.: NEVER TRUE

## 2025-05-16 SDOH — ECONOMIC STABILITY: FOOD INSECURITY: WITHIN THE PAST 12 MONTHS, YOU WORRIED THAT YOUR FOOD WOULD RUN OUT BEFORE YOU GOT MONEY TO BUY MORE.: NEVER TRUE

## 2025-05-16 ASSESSMENT — ENCOUNTER SYMPTOMS
VOMITING: 0
DIARRHEA: 0
CONSTIPATION: 0
COUGH: 0
NAUSEA: 0
ABDOMINAL PAIN: 0
WHEEZING: 0
SHORTNESS OF BREATH: 0
SORE THROAT: 0

## 2025-05-16 ASSESSMENT — PATIENT HEALTH QUESTIONNAIRE - PHQ9
SUM OF ALL RESPONSES TO PHQ QUESTIONS 1-9: 0
SUM OF ALL RESPONSES TO PHQ QUESTIONS 1-9: 0
2. FEELING DOWN, DEPRESSED OR HOPELESS: NOT AT ALL
SUM OF ALL RESPONSES TO PHQ QUESTIONS 1-9: 0
1. LITTLE INTEREST OR PLEASURE IN DOING THINGS: NOT AT ALL
SUM OF ALL RESPONSES TO PHQ QUESTIONS 1-9: 0

## 2025-05-16 NOTE — PROGRESS NOTES
Ratio 1.3 1.0 - 1.9     PSA Screening    Collection Time: 04/30/25  8:08 AM   Result Value Ref Range    PSA 0.7 0.0 - 4.0 ng/mL   AMB POC KTY COMPLETE CBC    Collection Time: 04/30/25  8:18 AM   Result Value Ref Range    WBC, POC 6.8 K/uL    Lymphocyte % 44.6 %    Monocyte % 9.6 %    Granulocytes %, POC 45.8 %    Lymphs Abs 3.0 K/uL    Monocyte Absolute, POC 0.7 K/uL    Granulocytes Abs 3.1 K/uL    RBC, POC 4.93 M/uL    Hemoglobin, POC 14.3 G/DL    Hematocrit, POC 43.0 %    MCV 87.3     MCH 29.0 >=20 pg    MCHC 33.3     RDW, POC 12.2 %    Platelet Count,  K/UL    MPV POC 6.9 fL   AMB POC URINALYSIS DIP STICK AUTO W/O MICRO    Collection Time: 04/30/25  8:18 AM   Result Value Ref Range    Color, Urine, POC Yellow     Clarity, Urine, POC Clear     Glucose, Urine, POC Negative     Bilirubin, Urine, POC Negative     Ketones, Urine, POC Negative     Specific Gravity, Urine, POC 1.005 1.001 - 1.035    Blood, Urine, POC Negative Negative    pH, Urine, POC 5.5 4.6 - 8.0    Protein, Urine, POC Negative     Urobilinogen, POC Normal     Nitrite, Urine, POC Negative     Leukocyte Esterase, Urine, POC Negative        ASSESSMENT and PLAN    Visit Diagnoses and Associated Orders         Routine general medical examination at a health care facility    -  Primary           Mild intermittent reactive airway disease with acute exacerbation        albuterol sulfate HFA (PROVENTIL HFA) 108 (90 Base) MCG/ACT inhaler [43469]      fluticasone (FLONASE) 50 MCG/ACT nasal spray [89772]             Wheezing        albuterol sulfate HFA (PROVENTIL HFA) 108 (90 Base) MCG/ACT inhaler [53144]             Hypercholesterolemia        atorvastatin (LIPITOR) 20 MG tablet [34685]             Environmental and seasonal allergies        fluticasone (FLONASE) 50 MCG/ACT nasal spray [96040]             Elevated TSH        T4 [88814 Custom]   - Future Order    TSH [76416 Custom]   - Future Order    T3 [[id number] Custom]   - Future Order

## 2025-07-03 ENCOUNTER — LAB (OUTPATIENT)
Dept: FAMILY MEDICINE CLINIC | Facility: CLINIC | Age: 60
End: 2025-07-03

## 2025-07-03 DIAGNOSIS — R79.89 ELEVATED TSH: ICD-10-CM

## 2025-07-03 LAB
T3 SERPL-MCNC: 1.34 NG/ML (ref 0.6–1.81)
T4 SERPL-MCNC: 7.5 UG/DL (ref 4.5–11.7)
TSH, 3RD GENERATION: 5.14 UIU/ML (ref 0.27–4.2)

## (undated) DEVICE — SYR 5ML 1/5 GRAD LL NSAF LF --

## (undated) DEVICE — SYR 3ML LL TIP 1/10ML GRAD --

## (undated) DEVICE — CANNULA NSL ORAL AD FOR CAPNOFLEX CO2 O2 AIRLFE

## (undated) DEVICE — KENDALL RADIOLUCENT FOAM MONITORING ELECTRODE RECTANGULAR SHAPE: Brand: KENDALL

## (undated) DEVICE — CONNECTOR TBNG OD5-7MM O2 END DISP

## (undated) DEVICE — NDL PRT INJ NSAF BLNT 18GX1.5 --